# Patient Record
Sex: MALE | Race: WHITE | NOT HISPANIC OR LATINO
[De-identification: names, ages, dates, MRNs, and addresses within clinical notes are randomized per-mention and may not be internally consistent; named-entity substitution may affect disease eponyms.]

---

## 2019-10-10 ENCOUNTER — APPOINTMENT (OUTPATIENT)
Dept: CARDIOLOGY | Facility: CLINIC | Age: 54
End: 2019-10-10
Payer: COMMERCIAL

## 2019-10-10 ENCOUNTER — LABORATORY RESULT (OUTPATIENT)
Age: 54
End: 2019-10-10

## 2019-10-10 ENCOUNTER — TRANSCRIPTION ENCOUNTER (OUTPATIENT)
Age: 54
End: 2019-10-10

## 2019-10-10 ENCOUNTER — NON-APPOINTMENT (OUTPATIENT)
Age: 54
End: 2019-10-10

## 2019-10-10 VITALS
DIASTOLIC BLOOD PRESSURE: 86 MMHG | TEMPERATURE: 98 F | BODY MASS INDEX: 44.1 KG/M2 | HEART RATE: 65 BPM | HEIGHT: 71 IN | SYSTOLIC BLOOD PRESSURE: 150 MMHG | WEIGHT: 315 LBS | OXYGEN SATURATION: 95 %

## 2019-10-10 DIAGNOSIS — Z86.79 PERSONAL HISTORY OF OTHER DISEASES OF THE CIRCULATORY SYSTEM: ICD-10-CM

## 2019-10-10 PROCEDURE — 93000 ELECTROCARDIOGRAM COMPLETE: CPT

## 2019-10-10 PROCEDURE — 99386 PREV VISIT NEW AGE 40-64: CPT

## 2019-10-10 RX ORDER — ADALIMUMAB 40MG/0.8ML
40 KIT SUBCUTANEOUS
Refills: 0 | Status: ACTIVE | COMMUNITY
Start: 2019-10-10

## 2019-10-10 NOTE — HISTORY OF PRESENT ILLNESS
[FreeTextEntry1] : physical  [de-identified] : pt presents for yearly physical .pt with psoriasis /arthritis .pt with sleep apnea .pt with hx of svt s/p ablation 3 years ago .pt here for routine physical .pt feels well rsre dyspnea pt denies any chest  pain dizziness ,lightheadedness ,nausea vomiting diaphoresis\par

## 2019-10-10 NOTE — PHYSICAL EXAM
[No Acute Distress] : no acute distress [Well Nourished] : well nourished [Well Developed] : well developed [Well-Appearing] : well-appearing [Normal Sclera/Conjunctiva] : normal sclera/conjunctiva [PERRL] : pupils equal round and reactive to light [EOMI] : extraocular movements intact [Normal Outer Ear/Nose] : the outer ears and nose were normal in appearance [Normal Oropharynx] : the oropharynx was normal [No JVD] : no jugular venous distention [No Lymphadenopathy] : no lymphadenopathy [Supple] : supple [Thyroid Normal, No Nodules] : the thyroid was normal and there were no nodules present [No Respiratory Distress] : no respiratory distress  [No Accessory Muscle Use] : no accessory muscle use [Normal Rate] : normal rate  [Clear to Auscultation] : lungs were clear to auscultation bilaterally [Regular Rhythm] : with a regular rhythm [Normal S1, S2] : normal S1 and S2 [No Murmur] : no murmur heard [No Carotid Bruits] : no carotid bruits [No Abdominal Bruit] : a ~M bruit was not heard ~T in the abdomen [No Varicosities] : no varicosities [Pedal Pulses Present] : the pedal pulses are present [No Edema] : there was no peripheral edema [No Palpable Aorta] : no palpable aorta [No Extremity Clubbing/Cyanosis] : no extremity clubbing/cyanosis [Soft] : abdomen soft [Non Tender] : non-tender [Non-distended] : non-distended [No Masses] : no abdominal mass palpated [No HSM] : no HSM [Normal Bowel Sounds] : normal bowel sounds [Normal Posterior Cervical Nodes] : no posterior cervical lymphadenopathy [Normal Anterior Cervical Nodes] : no anterior cervical lymphadenopathy [No CVA Tenderness] : no CVA  tenderness [No Spinal Tenderness] : no spinal tenderness [No Joint Swelling] : no joint swelling [Grossly Normal Strength/Tone] : grossly normal strength/tone [No Rash] : no rash [Coordination Grossly Intact] : coordination grossly intact [No Focal Deficits] : no focal deficits [Normal Gait] : normal gait [Deep Tendon Reflexes (DTR)] : deep tendon reflexes were 2+ and symmetric [Normal Affect] : the affect was normal [Normal Insight/Judgement] : insight and judgment were intact [FreeTextEntry1] : normal prostate  [de-identified] : normal genital exam  [de-identified] : psoriatic rash torso lower legs and elbows freckles noted

## 2019-10-12 LAB
25(OH)D3 SERPL-MCNC: 21.6 NG/ML
ALBUMIN SERPL ELPH-MCNC: 4.8 G/DL
ALP BLD-CCNC: 70 U/L
ALT SERPL-CCNC: 44 U/L
ANION GAP SERPL CALC-SCNC: 18 MMOL/L
APPEARANCE: CLEAR
AST SERPL-CCNC: 45 U/L
BACTERIA: NEGATIVE
BASOPHILS # BLD AUTO: 0.1 K/UL
BASOPHILS NFR BLD AUTO: 1.2 %
BILIRUB SERPL-MCNC: 0.4 MG/DL
BILIRUBIN URINE: NEGATIVE
BLOOD URINE: NEGATIVE
BUN SERPL-MCNC: 12 MG/DL
CALCIUM SERPL-MCNC: 10.1 MG/DL
CHLORIDE SERPL-SCNC: 97 MMOL/L
CHOLEST SERPL-MCNC: 238 MG/DL
CHOLEST/HDLC SERPL: 7.2 RATIO
CO2 SERPL-SCNC: 25 MMOL/L
COLOR: YELLOW
CREAT SERPL-MCNC: 0.68 MG/DL
EOSINOPHIL # BLD AUTO: 0.26 K/UL
EOSINOPHIL NFR BLD AUTO: 3.1 %
ESTIMATED AVERAGE GLUCOSE: 209 MG/DL
GLUCOSE QUALITATIVE U: ABNORMAL
GLUCOSE SERPL-MCNC: 208 MG/DL
HBA1C MFR BLD HPLC: 8.9 %
HCT VFR BLD CALC: 48.8 %
HDLC SERPL-MCNC: 33 MG/DL
HGB BLD-MCNC: 16 G/DL
HYALINE CASTS: 0 /LPF
IMM GRANULOCYTES NFR BLD AUTO: 0.2 %
KETONES URINE: NEGATIVE
LDLC SERPL CALC-MCNC: 140 MG/DL
LEUKOCYTE ESTERASE URINE: NEGATIVE
LYMPHOCYTES # BLD AUTO: 3.42 K/UL
LYMPHOCYTES NFR BLD AUTO: 40.2 %
MAGNESIUM SERPL-MCNC: 1.9 MG/DL
MAN DIFF?: NORMAL
MCHC RBC-ENTMCNC: 30 PG
MCHC RBC-ENTMCNC: 32.8 GM/DL
MCV RBC AUTO: 91.4 FL
MICROSCOPIC-UA: NORMAL
MONOCYTES # BLD AUTO: 0.67 K/UL
MONOCYTES NFR BLD AUTO: 7.9 %
NEUTROPHILS # BLD AUTO: 4.03 K/UL
NEUTROPHILS NFR BLD AUTO: 47.4 %
NITRITE URINE: NEGATIVE
PH URINE: 6
PHOSPHATE SERPL-MCNC: 2.9 MG/DL
PLATELET # BLD AUTO: 246 K/UL
POTASSIUM SERPL-SCNC: 4.8 MMOL/L
PROT SERPL-MCNC: 7.9 G/DL
PROTEIN URINE: NORMAL
PSA SERPL-MCNC: 0.44 NG/ML
RBC # BLD: 5.34 M/UL
RBC # FLD: 13.2 %
RED BLOOD CELLS URINE: 1 /HPF
SODIUM SERPL-SCNC: 140 MMOL/L
SPECIFIC GRAVITY URINE: 1.03
SQUAMOUS EPITHELIAL CELLS: 0 /HPF
T3RU NFR SERPL: 1.1 TBI
T4 FREE SERPL-MCNC: 1.1 NG/DL
T4 SERPL-MCNC: 7 UG/DL
TRIGL SERPL-MCNC: 323 MG/DL
TSH SERPL-ACNC: 2.95 UIU/ML
URATE SERPL-MCNC: 8.2 MG/DL
UROBILINOGEN URINE: NORMAL
WBC # FLD AUTO: 8.5 K/UL
WHITE BLOOD CELLS URINE: 1 /HPF

## 2019-10-30 ENCOUNTER — APPOINTMENT (OUTPATIENT)
Dept: CARDIOLOGY | Facility: CLINIC | Age: 54
End: 2019-10-30
Payer: COMMERCIAL

## 2019-10-30 PROCEDURE — 93321 DOPPLER ECHO F-UP/LMTD STD: CPT

## 2019-10-30 PROCEDURE — 93351 STRESS TTE COMPLETE: CPT

## 2019-10-30 PROCEDURE — 93325 DOPPLER ECHO COLOR FLOW MAPG: CPT

## 2019-11-08 ENCOUNTER — NON-APPOINTMENT (OUTPATIENT)
Age: 54
End: 2019-11-08

## 2019-11-09 LAB
APPEARANCE: CLEAR
BACTERIA: NEGATIVE
BASOPHILS # BLD AUTO: 0.1 K/UL
BASOPHILS NFR BLD AUTO: 1.1 %
BILIRUBIN URINE: NEGATIVE
BLOOD URINE: NEGATIVE
CHOLEST SERPL-MCNC: 132 MG/DL
CHOLEST/HDLC SERPL: 3.9 RATIO
COLOR: NORMAL
CREAT SPEC-SCNC: 85 MG/DL
EOSINOPHIL # BLD AUTO: 0.32 K/UL
EOSINOPHIL NFR BLD AUTO: 3.4 %
GLUCOSE QUALITATIVE U: NEGATIVE
HCT VFR BLD CALC: 45.5 %
HDLC SERPL-MCNC: 34 MG/DL
HGB BLD-MCNC: 14.6 G/DL
HYALINE CASTS: 0 /LPF
IMM GRANULOCYTES NFR BLD AUTO: 0.3 %
KETONES URINE: NEGATIVE
LDLC SERPL CALC-MCNC: 68 MG/DL
LEUKOCYTE ESTERASE URINE: NEGATIVE
LYMPHOCYTES # BLD AUTO: 3.33 K/UL
LYMPHOCYTES NFR BLD AUTO: 35.7 %
MAN DIFF?: NORMAL
MCHC RBC-ENTMCNC: 29.4 PG
MCHC RBC-ENTMCNC: 32.1 GM/DL
MCV RBC AUTO: 91.7 FL
MICROALBUMIN 24H UR DL<=1MG/L-MCNC: <1.2 MG/DL
MICROALBUMIN/CREAT 24H UR-RTO: NORMAL MG/G
MICROSCOPIC-UA: NORMAL
MONOCYTES # BLD AUTO: 0.73 K/UL
MONOCYTES NFR BLD AUTO: 7.8 %
NEUTROPHILS # BLD AUTO: 4.81 K/UL
NEUTROPHILS NFR BLD AUTO: 51.7 %
NITRITE URINE: NEGATIVE
PH URINE: 6.5
PLATELET # BLD AUTO: 240 K/UL
PROTEIN URINE: NEGATIVE
RBC # BLD: 4.96 M/UL
RBC # FLD: 13.3 %
RED BLOOD CELLS URINE: 1 /HPF
SPECIFIC GRAVITY URINE: 1.01
SQUAMOUS EPITHELIAL CELLS: 0 /HPF
TRIGL SERPL-MCNC: 148 MG/DL
UROBILINOGEN URINE: NORMAL
WBC # FLD AUTO: 9.32 K/UL
WHITE BLOOD CELLS URINE: 0 /HPF

## 2019-11-16 PROCEDURE — 93224 XTRNL ECG REC UP TO 48 HRS: CPT

## 2019-11-18 ENCOUNTER — FORM ENCOUNTER (OUTPATIENT)
Age: 54
End: 2019-11-18

## 2019-11-19 ENCOUNTER — APPOINTMENT (OUTPATIENT)
Dept: PULMONOLOGY | Facility: CLINIC | Age: 54
End: 2019-11-19
Payer: COMMERCIAL

## 2019-11-19 ENCOUNTER — APPOINTMENT (OUTPATIENT)
Dept: GASTROENTEROLOGY | Facility: CLINIC | Age: 54
End: 2019-11-19
Payer: COMMERCIAL

## 2019-11-19 ENCOUNTER — TRANSCRIPTION ENCOUNTER (OUTPATIENT)
Age: 54
End: 2019-11-19

## 2019-11-19 VITALS
WEIGHT: 310 LBS | HEART RATE: 62 BPM | SYSTOLIC BLOOD PRESSURE: 132 MMHG | DIASTOLIC BLOOD PRESSURE: 82 MMHG | BODY MASS INDEX: 43.4 KG/M2 | OXYGEN SATURATION: 94 % | HEIGHT: 71 IN | RESPIRATION RATE: 16 BRPM

## 2019-11-19 VITALS
TEMPERATURE: 98.2 F | SYSTOLIC BLOOD PRESSURE: 130 MMHG | OXYGEN SATURATION: 98 % | BODY MASS INDEX: 44.1 KG/M2 | DIASTOLIC BLOOD PRESSURE: 70 MMHG | HEART RATE: 65 BPM | HEIGHT: 71 IN | WEIGHT: 315 LBS

## 2019-11-19 DIAGNOSIS — Z82.49 FAMILY HISTORY OF ISCHEMIC HEART DISEASE AND OTHER DISEASES OF THE CIRCULATORY SYSTEM: ICD-10-CM

## 2019-11-19 DIAGNOSIS — Z86.39 PERSONAL HISTORY OF OTHER ENDOCRINE, NUTRITIONAL AND METABOLIC DISEASE: ICD-10-CM

## 2019-11-19 DIAGNOSIS — Z78.9 OTHER SPECIFIED HEALTH STATUS: ICD-10-CM

## 2019-11-19 DIAGNOSIS — Z98.890 OTHER SPECIFIED POSTPROCEDURAL STATES: ICD-10-CM

## 2019-11-19 DIAGNOSIS — Z83.3 FAMILY HISTORY OF DIABETES MELLITUS: ICD-10-CM

## 2019-11-19 DIAGNOSIS — Z86.79 OTHER SPECIFIED POSTPROCEDURAL STATES: ICD-10-CM

## 2019-11-19 PROCEDURE — 94729 DIFFUSING CAPACITY: CPT

## 2019-11-19 PROCEDURE — 94060 EVALUATION OF WHEEZING: CPT

## 2019-11-19 PROCEDURE — 99203 OFFICE O/P NEW LOW 30 MIN: CPT | Mod: 25

## 2019-11-19 PROCEDURE — 94727 GAS DIL/WSHOT DETER LNG VOL: CPT

## 2019-11-19 PROCEDURE — 99205 OFFICE O/P NEW HI 60 MIN: CPT | Mod: 25

## 2019-11-19 PROCEDURE — 99204 OFFICE O/P NEW MOD 45 MIN: CPT

## 2019-11-19 NOTE — ASSESSMENT
[FreeTextEntry1] : Obtain CPAP data. Pt to call with company\par Obtain Poly\par Weight loss recommended and discussed.\par Recomendations after review of above.

## 2019-11-19 NOTE — DISCUSSION/SUMMARY
[FreeTextEntry1] : CONCEPCIÓN on CPAP\par Overweight\par Mild restrictive physiology related to body habitus. \par Elevated [place] on CPAP

## 2019-11-19 NOTE — CONSULT LETTER
[Dear  ___] : Dear  [unfilled], [Consult Letter:] : I had the pleasure of evaluating your patient, [unfilled]. [Consult Closing:] : Thank you very much for allowing me to participate in the care of this patient.  If you have any questions, please do not hesitate to contact me. [Please see my note below.] : Please see my note below. [Sincerely,] : Sincerely, [FreeTextEntry3] : Conor Oh MD, FACG\par

## 2019-11-19 NOTE — HISTORY OF PRESENT ILLNESS
[Heartburn] : denies heartburn [Nausea] : denies nausea [Vomiting] : denies vomiting [Diarrhea] : denies diarrhea [Yellow Skin Or Eyes (Jaundice)] : denies jaundice [Constipation] : denies constipation [Abdominal Swelling] : denies abdominal swelling [Abdominal Pain] : denies abdominal pain [Rectal Pain] : denies rectal pain [Wt Gain ___ Lbs] : no recent weight gain [Wt Loss ___ Lbs] : no recent weight loss [GERD] : no gastroesophageal reflux disease [Hiatus Hernia] : no hiatus hernia [Peptic Ulcer Disease] : no peptic ulcer disease [Pancreatitis] : no pancreatitis [Cholelithiasis] : no cholelithiasis [Kidney Stone] : no kidney stone [Inflammatory Bowel Disease] : no inflammatory bowel disease [Irritable Bowel Syndrome] : no irritable bowel syndrome [Diverticulitis] : no diverticulitis [Alcohol Abuse] : no alcohol abuse [Malignancy] : no malignancy [Abdominal Surgery] : no abdominal surgery [Appendectomy] : no appendectomy [Cholecystectomy] : no cholecystectomy [de-identified] : 54 year old man referred forr a screening colonoscopy. This will be his first colonoscopy. He denies rectal bleeding, melena or hematemesis. He has Psoriatic Arthritis for which he takes Humira. He is /s/p ablation fo SVT. He has CONCEPCIÓN for which he wears a mask. [FreeTextEntry1] : First colonoscopy

## 2019-11-19 NOTE — PHYSICAL EXAM
[Sclera] : the sclera and conjunctiva were normal [General Appearance - In No Acute Distress] : in no acute distress [General Appearance - Alert] : alert [Extraocular Movements] : extraocular movements were intact [PERRL With Normal Accommodation] : pupils were equal in size, round, and reactive to light [Outer Ear] : the ears and nose were normal in appearance [Oropharynx] : the oropharynx was normal [Jugular Venous Distention Increased] : there was no jugular-venous distention [Neck Appearance] : the appearance of the neck was normal [Neck Cervical Mass (___cm)] : no neck mass was observed [Thyroid Diffuse Enlargement] : the thyroid was not enlarged [Thyroid Nodule] : there were no palpable thyroid nodules [Auscultation Breath Sounds / Voice Sounds] : lungs were clear to auscultation bilaterally [Heart Rate And Rhythm] : heart rate was normal and rhythm regular [Heart Sounds] : normal S1 and S2 [Heart Sounds Gallop] : no gallops [Heart Sounds Pericardial Friction Rub] : no pericardial rub [Murmurs] : no murmurs [Bowel Sounds] : normal bowel sounds [Abdomen Soft] : soft [Abdomen Tenderness] : non-tender [] : no hepato-splenomegaly [Abdomen Mass (___ Cm)] : no abdominal mass palpated [Cervical Lymph Nodes Enlarged Posterior Bilaterally] : posterior cervical [Cervical Lymph Nodes Enlarged Anterior Bilaterally] : anterior cervical [Supraclavicular Lymph Nodes Enlarged Bilaterally] : supraclavicular [No CVA Tenderness] : no ~M costovertebral angle tenderness [No Spinal Tenderness] : no spinal tenderness [Abnormal Walk] : normal gait [Motor Tone] : muscle strength and tone were normal [Musculoskeletal - Swelling] : no joint swelling seen [Nail Clubbing] : no clubbing  or cyanosis of the fingernails [Deep Tendon Reflexes (DTR)] : deep tendon reflexes were 2+ and symmetric [Sensation] : the sensory exam was normal to light touch and pinprick [No Focal Deficits] : no focal deficits [Oriented To Time, Place, And Person] : oriented to person, place, and time [Impaired Insight] : insight and judgment were intact [Affect] : the affect was normal

## 2019-11-19 NOTE — HISTORY OF PRESENT ILLNESS
[Obstructive Sleep Apnea] : obstructive sleep apnea [FreeTextEntry1] : SELENE JUSTICE is a 54 year old  M referred for pulmonary evaluation for CONCEPCIÓN\par \par On CPAP. Starts with CPAP wears 3-4 hours then takes it off. Wakes up and it is off.\par Prescirbed Dr. Gonzalez in [place] 1 year ago. Was primary care doctor.\par \par On [employer]. Unaware of parameters. \par Company \par \par Past pulmonary history.  CONCEPCIÓN\par Occupational Exposure. N\par Family history of pulmonary disease. N\par Recent travel 2-3 x yre [place]\par Pets Dog not allert\par \par Wt  trying to lose. \par \par Feeling pretty well\par Snoring with machine off. \par

## 2019-11-19 NOTE — PROCEDURE
[FreeTextEntry1] : 11/19/2019\par Pulmonary function testing\par There is a mild ventilatory impairment in a restrictive pattern. There is no significant bronchodilator response. There is elevation in the RV/TLC ratio indicative of possible air trapping. Diffusion capacity is normal. \par \par [place]

## 2019-11-19 NOTE — PHYSICAL EXAM
[FreeTextEntry1] : Small oropharynx [Jugular Venous Distention Increased] : there was no jugular-venous distention [Thyroid Diffuse Enlargement] : the thyroid was not enlarged [Heart Sounds] : normal S1 and S2 [Murmurs] : no murmurs present [Auscultation Breath Sounds / Voice Sounds] : lungs were clear to auscultation bilaterally [Lungs Percussion] : the lungs were normal to percussion [Abdomen Soft] : soft [] : no hepato-splenomegaly [Abdomen Tenderness] : non-tender [Cyanosis, Localized] : no localized cyanosis [Nail Clubbing] : no clubbing of the fingernails

## 2019-11-20 ENCOUNTER — TRANSCRIPTION ENCOUNTER (OUTPATIENT)
Age: 54
End: 2019-11-20

## 2019-11-20 ENCOUNTER — APPOINTMENT (OUTPATIENT)
Dept: ENDOCRINOLOGY | Facility: CLINIC | Age: 54
End: 2019-11-20
Payer: COMMERCIAL

## 2019-11-20 ENCOUNTER — MEDICATION RENEWAL (OUTPATIENT)
Age: 54
End: 2019-11-20

## 2019-11-20 VITALS
HEART RATE: 58 BPM | DIASTOLIC BLOOD PRESSURE: 72 MMHG | BODY MASS INDEX: 44.1 KG/M2 | WEIGHT: 315 LBS | OXYGEN SATURATION: 96 % | HEIGHT: 71 IN | SYSTOLIC BLOOD PRESSURE: 124 MMHG

## 2019-11-20 PROCEDURE — 82044 UR ALBUMIN SEMIQUANTITATIVE: CPT | Mod: QW

## 2019-11-20 PROCEDURE — 83036 HEMOGLOBIN GLYCOSYLATED A1C: CPT | Mod: QW

## 2019-11-20 PROCEDURE — 99204 OFFICE O/P NEW MOD 45 MIN: CPT

## 2019-11-20 PROCEDURE — 82962 GLUCOSE BLOOD TEST: CPT

## 2019-11-20 RX ORDER — LANCETS 33 GAUGE
EACH MISCELLANEOUS
Qty: 2 | Refills: 3 | Status: ACTIVE | COMMUNITY
Start: 2019-11-20 | End: 1900-01-01

## 2019-11-20 RX ORDER — BLOOD SUGAR DIAGNOSTIC
STRIP MISCELLANEOUS DAILY
Qty: 2 | Refills: 3 | Status: ACTIVE | COMMUNITY
Start: 2019-11-20 | End: 1900-01-01

## 2019-11-21 LAB
ALBUMIN: 10
CREATININE: 100
GLUCOSE BLDC GLUCOMTR-MCNC: 132
HBA1C MFR BLD HPLC: 7.9
MICROALBUMIN/CREAT UR TEST STR-RTO: 30

## 2019-12-08 NOTE — HISTORY OF PRESENT ILLNESS
[FreeTextEntry1] : Mr. JUSTICE   is a 54 year old male  who presents for initial evaluation with regard to elevated blood glucose recent  HHbA1c level returned at 8.9% He presents via the courtesy of Dr. Richards.  I explained that this would be consistent with a dx of diabetes mellitus. In previous years -told of borderline bg elevation.Patient denies any polyuria or polydipsia. Too He  denies any significant weight change or any visual changes.  He  too denies any neurologic signs or symptoms. There too has been no chest discomfort or sob.  Further, He    denies any podiatric concerns and too denies any non-healing skin lesions on the feet.\par Additional medical history includes that of  hyperlipidemia, obesity , francia and vitamin d deficiency along with psoriatic arthritis and svt. He is on Humira along with Atorvastatin and too was recently placed on Metformin 500 mg bid.\par he did obtain bg meter and am values about 160  and aat 8 pm-about 2 /12 hrs post dinner bg's are in [id number]\par Wt a t 308 -stable over last 3-4 yrs\par mat FG hd type 2 was on insulin.   2 sisters no Dm\par Does have sleep apnea -uses cpap\par DId have ablation for svt.\par Now walking with wife-too, to obtain [place] treadmill.\par had met with dietitian.

## 2019-12-08 NOTE — PHYSICAL EXAM
[No Acute Distress] : no acute distress [Alert] : alert [Well Nourished] : well nourished [Well Developed] : well developed [Normal Sclera/Conjunctiva] : normal sclera/conjunctiva [No Proptosis] : no proptosis [EOMI] : extra ocular movement intact [Thyroid Not Enlarged] : the thyroid was not enlarged [Normal Oropharynx] : the oropharynx was normal [No Thyroid Nodules] : there were no palpable thyroid nodules [No Respiratory Distress] : no respiratory distress [Clear to Auscultation] : lungs were clear to auscultation bilaterally [No Accessory Muscle Use] : no accessory muscle use [Normal S1, S2] : normal S1 and S2 [Normal Rate] : heart rate was normal  [Regular Rhythm] : with a regular rhythm [Pedal Pulses Normal] : the pedal pulses are present [No Edema] : there was no peripheral edema [Normal Bowel Sounds] : normal bowel sounds [Not Tender] : non-tender [Soft] : abdomen soft [Post Cervical Nodes] : posterior cervical nodes [Not Distended] : not distended [Anterior Cervical Nodes] : anterior cervical nodes [Axillary Nodes] : axillary nodes [Normal] : normal and non tender [No Spinal Tenderness] : no spinal tenderness [No Stigmata of Cushings Syndrome] : no stigmata of cushings syndrome [Spine Straight] : spine straight [Normal Strength/Tone] : muscle strength and tone were normal [Normal Gait] : normal gait [No Rash] : no rash [No Tremors] : no tremors [Normal Reflexes] : deep tendon reflexes were 2+ and symmetric [Oriented x3] : oriented to person, place, and time [Acanthosis Nigricans] : no acanthosis nigricans

## 2019-12-17 ENCOUNTER — OTHER (OUTPATIENT)
Age: 54
End: 2019-12-17

## 2020-01-03 ENCOUNTER — APPOINTMENT (OUTPATIENT)
Dept: CARDIOLOGY | Facility: CLINIC | Age: 55
End: 2020-01-03

## 2020-01-24 ENCOUNTER — APPOINTMENT (OUTPATIENT)
Dept: GASTROENTEROLOGY | Facility: HOSPITAL | Age: 55
End: 2020-01-24

## 2020-03-04 ENCOUNTER — APPOINTMENT (OUTPATIENT)
Dept: CARDIOLOGY | Facility: CLINIC | Age: 55
End: 2020-03-04
Payer: COMMERCIAL

## 2020-03-04 ENCOUNTER — NON-APPOINTMENT (OUTPATIENT)
Age: 55
End: 2020-03-04

## 2020-03-04 VITALS — SYSTOLIC BLOOD PRESSURE: 150 MMHG | DIASTOLIC BLOOD PRESSURE: 82 MMHG

## 2020-03-04 VITALS
HEIGHT: 71 IN | DIASTOLIC BLOOD PRESSURE: 86 MMHG | HEART RATE: 75 BPM | TEMPERATURE: 98 F | SYSTOLIC BLOOD PRESSURE: 158 MMHG | OXYGEN SATURATION: 95 % | WEIGHT: 301 LBS | BODY MASS INDEX: 42.14 KG/M2

## 2020-03-04 DIAGNOSIS — R06.02 SHORTNESS OF BREATH: ICD-10-CM

## 2020-03-04 DIAGNOSIS — M79.10 MYALGIA, UNSPECIFIED SITE: ICD-10-CM

## 2020-03-04 DIAGNOSIS — R94.39 ABNORMAL RESULT OF OTHER CARDIOVASCULAR FUNCTION STUDY: ICD-10-CM

## 2020-03-04 PROCEDURE — 99214 OFFICE O/P EST MOD 30 MIN: CPT

## 2020-03-04 PROCEDURE — 78452 HT MUSCLE IMAGE SPECT MULT: CPT

## 2020-03-04 PROCEDURE — 93306 TTE W/DOPPLER COMPLETE: CPT

## 2020-03-04 PROCEDURE — A9500: CPT

## 2020-03-04 PROCEDURE — 93015 CV STRESS TEST SUPVJ I&R: CPT

## 2020-03-04 PROCEDURE — 93351 STRESS TTE COMPLETE: CPT

## 2020-03-04 PROCEDURE — 93000 ELECTROCARDIOGRAM COMPLETE: CPT

## 2020-03-04 NOTE — REASON FOR VISIT
[Follow-Up - Clinic] : a clinic follow-up of [Dyspnea] : dyspnea [Hyperlipidemia] : hyperlipidemia [Hypertension] : hypertension [FreeTextEntry1] : Acute issue of SOB and Muscle Fatigue

## 2020-03-04 NOTE — HISTORY OF PRESENT ILLNESS
[FreeTextEntry1] : This is a 54 year old gentlemen with a PMH of SVT, HTN, DM, psoriasis/Arthritis, CONCEPCIÓN, and Vitamin D deficiency presents today for an acute cardiac issue. Patient states that for the last 3-4 days he has been experiencing  shortness of breath with exertion, he has noticed extreme muscle aching. Patient states that this AM he was seen by the Rheumatologist- Dr. Buck and was told that his BP is elevated. Patient denies , palpitations, chest pain, nausea, vomiting, dizziness and lightheadedness.pt with c.p with exertion \par

## 2020-03-04 NOTE — PHYSICAL EXAM
[General Appearance - Well Developed] : well developed [Normal Appearance] : normal appearance [General Appearance - Well Nourished] : well nourished [Well Groomed] : well groomed [No Deformities] : no deformities [General Appearance - In No Acute Distress] : no acute distress [Normal Conjunctiva] : the conjunctiva exhibited no abnormalities [Eyelids - No Xanthelasma] : the eyelids demonstrated no xanthelasmas [Normal Oral Mucosa] : normal oral mucosa [No Oral Pallor] : no oral pallor [No Oral Cyanosis] : no oral cyanosis [Normal Jugular Venous A Waves Present] : normal jugular venous A waves present [Normal Jugular Venous V Waves Present] : normal jugular venous V waves present [No Jugular Venous Reis A Waves] : no jugular venous reis A waves [Respiration, Rhythm And Depth] : normal respiratory rhythm and effort [Exaggerated Use Of Accessory Muscles For Inspiration] : no accessory muscle use [Auscultation Breath Sounds / Voice Sounds] : lungs were clear to auscultation bilaterally [Heart Rate And Rhythm] : heart rate and rhythm were normal [Heart Sounds] : normal S1 and S2 [Murmurs] : no murmurs present [Abdomen Soft] : soft [Abdomen Tenderness] : non-tender [Abdomen Mass (___ Cm)] : no abdominal mass palpated [Abnormal Walk] : normal gait [Gait - Sufficient For Exercise Testing] : the gait was sufficient for exercise testing [Cyanosis, Localized] : no localized cyanosis [Nail Clubbing] : no clubbing of the fingernails [Petechial Hemorrhages (___cm)] : no petechial hemorrhages [Skin Color & Pigmentation] : normal skin color and pigmentation [] : no rash [No Venous Stasis] : no venous stasis [Skin Lesions] : no skin lesions [No Xanthoma] : no  xanthoma was observed [No Skin Ulcers] : no skin ulcer [Oriented To Time, Place, And Person] : oriented to person, place, and time [Affect] : the affect was normal [Mood] : the mood was normal [No Anxiety] : not feeling anxious

## 2020-03-04 NOTE — REVIEW OF SYSTEMS
[Shortness Of Breath] : shortness of breath [Dyspnea on exertion] : dyspnea during exertion [Joint Pain] : joint pain [Negative] : Heme/Lymph [Chest  Pressure] : no chest pressure [Chest Pain] : no chest pain [Lower Ext Edema] : no extremity edema [Palpitations] : no palpitations [Leg Claudication] : no intermittent leg claudication [Joint Swelling] : no joint swelling [Joint Stiffness] : no joint stiffness [Muscle Cramps] : no muscle cramps [Limb Weakness (Paresis)] : no limb weakness

## 2020-03-05 ENCOUNTER — INPATIENT (INPATIENT)
Facility: HOSPITAL | Age: 55
LOS: 0 days | Discharge: HOME | DRG: 247 | End: 2020-03-06
Attending: INTERNAL MEDICINE | Admitting: INTERNAL MEDICINE
Payer: COMMERCIAL

## 2020-03-05 VITALS
SYSTOLIC BLOOD PRESSURE: 134 MMHG | HEART RATE: 65 BPM | TEMPERATURE: 98 F | OXYGEN SATURATION: 95 % | RESPIRATION RATE: 14 BRPM | HEIGHT: 71 IN | DIASTOLIC BLOOD PRESSURE: 63 MMHG | WEIGHT: 300.05 LBS

## 2020-03-05 DIAGNOSIS — R94.39 ABNORMAL RESULT OF OTHER CARDIOVASCULAR FUNCTION STUDY: ICD-10-CM

## 2020-03-05 DIAGNOSIS — Q68.6 DISCOID MENISCUS: Chronic | ICD-10-CM

## 2020-03-05 DIAGNOSIS — Z98.890 OTHER SPECIFIED POSTPROCEDURAL STATES: Chronic | ICD-10-CM

## 2020-03-05 LAB
ANION GAP SERPL CALC-SCNC: 15 MMOL/L — SIGNIFICANT CHANGE UP (ref 5–17)
BUN SERPL-MCNC: 13 MG/DL — SIGNIFICANT CHANGE UP (ref 7–23)
CALCIUM SERPL-MCNC: 9.4 MG/DL — SIGNIFICANT CHANGE UP (ref 8.4–10.5)
CHLORIDE SERPL-SCNC: 100 MMOL/L — SIGNIFICANT CHANGE UP (ref 96–108)
CO2 SERPL-SCNC: 27 MMOL/L — SIGNIFICANT CHANGE UP (ref 22–31)
CREAT SERPL-MCNC: 0.78 MG/DL — SIGNIFICANT CHANGE UP (ref 0.5–1.3)
GLUCOSE BLDC GLUCOMTR-MCNC: 169 MG/DL — HIGH (ref 70–99)
GLUCOSE SERPL-MCNC: 137 MG/DL — HIGH (ref 70–99)
HCT VFR BLD CALC: 46.5 % — SIGNIFICANT CHANGE UP (ref 39–50)
HGB BLD-MCNC: 15.3 G/DL — SIGNIFICANT CHANGE UP (ref 13–17)
MCHC RBC-ENTMCNC: 28.9 PG — SIGNIFICANT CHANGE UP (ref 27–34)
MCHC RBC-ENTMCNC: 32.9 GM/DL — SIGNIFICANT CHANGE UP (ref 32–36)
MCV RBC AUTO: 87.9 FL — SIGNIFICANT CHANGE UP (ref 80–100)
NRBC # BLD: 0 /100 WBCS — SIGNIFICANT CHANGE UP (ref 0–0)
PLATELET # BLD AUTO: 230 K/UL — SIGNIFICANT CHANGE UP (ref 150–400)
POTASSIUM SERPL-MCNC: 4.1 MMOL/L — SIGNIFICANT CHANGE UP (ref 3.5–5.3)
POTASSIUM SERPL-SCNC: 4.1 MMOL/L — SIGNIFICANT CHANGE UP (ref 3.5–5.3)
RBC # BLD: 5.29 M/UL — SIGNIFICANT CHANGE UP (ref 4.2–5.8)
RBC # FLD: 13 % — SIGNIFICANT CHANGE UP (ref 10.3–14.5)
SODIUM SERPL-SCNC: 142 MMOL/L — SIGNIFICANT CHANGE UP (ref 135–145)
WBC # BLD: 9.47 K/UL — SIGNIFICANT CHANGE UP (ref 3.8–10.5)
WBC # FLD AUTO: 9.47 K/UL — SIGNIFICANT CHANGE UP (ref 3.8–10.5)

## 2020-03-05 PROCEDURE — 93458 L HRT ARTERY/VENTRICLE ANGIO: CPT | Mod: 26,59

## 2020-03-05 PROCEDURE — 99152 MOD SED SAME PHYS/QHP 5/>YRS: CPT

## 2020-03-05 PROCEDURE — 92928 PRQ TCAT PLMT NTRAC ST 1 LES: CPT | Mod: LD

## 2020-03-05 PROCEDURE — 93010 ELECTROCARDIOGRAM REPORT: CPT | Mod: 76

## 2020-03-05 RX ORDER — CLOPIDOGREL BISULFATE 75 MG/1
75 TABLET, FILM COATED ORAL DAILY
Refills: 0 | Status: DISCONTINUED | OUTPATIENT
Start: 2020-03-05 | End: 2020-03-06

## 2020-03-05 RX ORDER — DEXTROSE 50 % IN WATER 50 %
15 SYRINGE (ML) INTRAVENOUS ONCE
Refills: 0 | Status: DISCONTINUED | OUTPATIENT
Start: 2020-03-05 | End: 2020-03-06

## 2020-03-05 RX ORDER — ATORVASTATIN CALCIUM 80 MG/1
20 TABLET, FILM COATED ORAL AT BEDTIME
Refills: 0 | Status: DISCONTINUED | OUTPATIENT
Start: 2020-03-05 | End: 2020-03-06

## 2020-03-05 RX ORDER — ASPIRIN/CALCIUM CARB/MAGNESIUM 324 MG
81 TABLET ORAL DAILY
Refills: 0 | Status: DISCONTINUED | OUTPATIENT
Start: 2020-03-05 | End: 2020-03-06

## 2020-03-05 RX ORDER — INSULIN LISPRO 100/ML
VIAL (ML) SUBCUTANEOUS AT BEDTIME
Refills: 0 | Status: DISCONTINUED | OUTPATIENT
Start: 2020-03-05 | End: 2020-03-06

## 2020-03-05 RX ORDER — GLUCAGON INJECTION, SOLUTION 0.5 MG/.1ML
1 INJECTION, SOLUTION SUBCUTANEOUS ONCE
Refills: 0 | Status: DISCONTINUED | OUTPATIENT
Start: 2020-03-05 | End: 2020-03-06

## 2020-03-05 RX ORDER — DEXTROSE 50 % IN WATER 50 %
25 SYRINGE (ML) INTRAVENOUS ONCE
Refills: 0 | Status: DISCONTINUED | OUTPATIENT
Start: 2020-03-05 | End: 2020-03-06

## 2020-03-05 RX ORDER — DEXTROSE 50 % IN WATER 50 %
12.5 SYRINGE (ML) INTRAVENOUS ONCE
Refills: 0 | Status: DISCONTINUED | OUTPATIENT
Start: 2020-03-05 | End: 2020-03-06

## 2020-03-05 RX ORDER — CLOPIDOGREL BISULFATE 75 MG/1
1 TABLET, FILM COATED ORAL
Qty: 90 | Refills: 3
Start: 2020-03-05 | End: 2021-02-27

## 2020-03-05 RX ORDER — INSULIN LISPRO 100/ML
VIAL (ML) SUBCUTANEOUS
Refills: 0 | Status: DISCONTINUED | OUTPATIENT
Start: 2020-03-05 | End: 2020-03-06

## 2020-03-05 RX ORDER — SODIUM CHLORIDE 9 MG/ML
1000 INJECTION, SOLUTION INTRAVENOUS
Refills: 0 | Status: DISCONTINUED | OUTPATIENT
Start: 2020-03-05 | End: 2020-03-06

## 2020-03-05 RX ADMIN — ATORVASTATIN CALCIUM 20 MILLIGRAM(S): 80 TABLET, FILM COATED ORAL at 20:56

## 2020-03-05 NOTE — CHART NOTE - NSCHARTNOTEFT_GEN_A_CORE
Patient underwent a PCI procedure and is being admitted as they are at increased risk for major adverse cardiac and vascular events if discharged due to the following high risk characteristics:  Bifurcation lesion

## 2020-03-05 NOTE — H&P CARDIOLOGY - PMH
Arthritis    DM (diabetes mellitus)    HLD (hyperlipidemia)    CONCEPCIÓN (obstructive sleep apnea)  uses CPAP  Psoriasis    SVT (supraventricular tachycardia)  s/p ablation  Vitamin D deficiency

## 2020-03-05 NOTE — H&P CARDIOLOGY - HISTORY OF PRESENT ILLNESS
53 y/o  male with PMHx of DM2, HLD, SVT, s/p Ablation, CONCEPCIÓN uses CPAP, OA, Psoriasis presents today for LHC following an abnormal NST. Patient states he has exertional SOB for the past few weeks, noticed muscle aching Patient was seen by DR. Richards, NST done which was abnormal and referred for LHC. Denies chest pain, palpitation, dizziness/ syncope. 53 y/o  male with PMHx of DM2, HLD, SVT, s/p Ablation, CONCEPCIÓN uses CPAP, OA, Psoriasis presents today for LHC following an abnormal NST ( result not available). Patient states he has exertional SOB for the past few weeks, noticed muscle aching Patient was seen by DR. Richards, NST done which was abnormal and referred for LHC. Denies chest pain, palpitation, dizziness/ syncope.   Patient had stress echo from 11/2019 which shows EF 60-65%, normal response during exercise and there was no stress induced wall motion abnormalities.  Narrative:     Symptoms:        Angina (Class): Class II       Ischemic Symptoms: SOB    Heart Failure:        Systolic/Diastolic/Combined: yes       NYHA Class (within 2 weeks): yes    Assessment of LVEF: 65%       EF: 65%       Assessed by: Stress echo       Date: 11/2019    Prior Cardiac Interventions:       PCI's:No       CABG: No    Noninvasive Testing: NST ( result not available)  Stress Test: Date:        Protocol:        Duration of Exercise:        Symptoms:        EKG Changes:        DTS:        Myocardial Imaging:        Risk Assessment:     Echo: Stress echo in 2019, EF 65%    Antianginal Therapies: No       Beta Blockers:         Calcium Channel Blockers:        Long Acting Nitrates:        Ranexa:     Associated Risk Factors:        Cerebrovascular Disease: N/A       Chronic Lung Disease: N/A       Peripheral Arterial Disease: N/A       Chronic Kidney Disease (if yes, what is GFR): N/A       Uncontrolled Diabetes (if yes, what is HgbA1C or FBS): N/A       Poorly Controlled Hypertension (if yes, what is SBP): N/A       Morbid Obesity (if yes, what is BMI): yes, BMI 42       History of Recent Ventricular Arrhythmia: N/A       Inability to Ambulate Safely: N/A       Need for Therapeutic Anticoagulation: N/A       Antiplatelet or Contrast Allergy: N/A

## 2020-03-06 ENCOUNTER — TRANSCRIPTION ENCOUNTER (OUTPATIENT)
Age: 55
End: 2020-03-06

## 2020-03-06 VITALS — DIASTOLIC BLOOD PRESSURE: 68 MMHG | HEART RATE: 66 BPM | SYSTOLIC BLOOD PRESSURE: 146 MMHG

## 2020-03-06 LAB
GLUCOSE BLDC GLUCOMTR-MCNC: 121 MG/DL — HIGH (ref 70–99)
GLUCOSE BLDC GLUCOMTR-MCNC: 180 MG/DL — HIGH (ref 70–99)
HBA1C BLD-MCNC: 7.1 % — HIGH (ref 4–5.6)

## 2020-03-06 PROCEDURE — 93010 ELECTROCARDIOGRAM REPORT: CPT

## 2020-03-06 PROCEDURE — C1769: CPT

## 2020-03-06 PROCEDURE — 99238 HOSP IP/OBS DSCHRG MGMT 30/<: CPT

## 2020-03-06 PROCEDURE — 99152 MOD SED SAME PHYS/QHP 5/>YRS: CPT

## 2020-03-06 PROCEDURE — C1874: CPT

## 2020-03-06 PROCEDURE — 93005 ELECTROCARDIOGRAM TRACING: CPT

## 2020-03-06 PROCEDURE — 83036 HEMOGLOBIN GLYCOSYLATED A1C: CPT

## 2020-03-06 PROCEDURE — 99153 MOD SED SAME PHYS/QHP EA: CPT

## 2020-03-06 PROCEDURE — C1894: CPT

## 2020-03-06 PROCEDURE — 85027 COMPLETE CBC AUTOMATED: CPT

## 2020-03-06 PROCEDURE — C1887: CPT

## 2020-03-06 PROCEDURE — C9600: CPT | Mod: LD

## 2020-03-06 PROCEDURE — 93458 L HRT ARTERY/VENTRICLE ANGIO: CPT | Mod: 59

## 2020-03-06 PROCEDURE — 82962 GLUCOSE BLOOD TEST: CPT

## 2020-03-06 PROCEDURE — 80048 BASIC METABOLIC PNL TOTAL CA: CPT

## 2020-03-06 RX ORDER — ATORVASTATIN CALCIUM 80 MG/1
80 TABLET, FILM COATED ORAL AT BEDTIME
Refills: 0 | Status: DISCONTINUED | OUTPATIENT
Start: 2020-03-06 | End: 2020-03-06

## 2020-03-06 RX ORDER — ATORVASTATIN CALCIUM 80 MG/1
1 TABLET, FILM COATED ORAL
Qty: 30 | Refills: 0
Start: 2020-03-06 | End: 2020-04-04

## 2020-03-06 RX ORDER — ASPIRIN/CALCIUM CARB/MAGNESIUM 324 MG
1 TABLET ORAL
Qty: 30 | Refills: 0
Start: 2020-03-06 | End: 2020-04-04

## 2020-03-06 RX ORDER — METOPROLOL TARTRATE 50 MG
0.5 TABLET ORAL
Qty: 30 | Refills: 0
Start: 2020-03-06 | End: 2020-04-04

## 2020-03-06 RX ORDER — METOPROLOL TARTRATE 50 MG
12.5 TABLET ORAL
Refills: 0 | Status: DISCONTINUED | OUTPATIENT
Start: 2020-03-06 | End: 2020-03-06

## 2020-03-06 RX ORDER — CLOPIDOGREL BISULFATE 75 MG/1
1 TABLET, FILM COATED ORAL
Qty: 90 | Refills: 3
Start: 2020-03-06 | End: 2021-02-28

## 2020-03-06 RX ORDER — CHOLECALCIFEROL (VITAMIN D3) 125 MCG
0 CAPSULE ORAL
Qty: 0 | Refills: 0 | DISCHARGE

## 2020-03-06 RX ORDER — ATORVASTATIN CALCIUM 80 MG/1
1 TABLET, FILM COATED ORAL
Qty: 0 | Refills: 0 | DISCHARGE

## 2020-03-06 RX ORDER — METFORMIN HYDROCHLORIDE 850 MG/1
1 TABLET ORAL
Qty: 0 | Refills: 0 | DISCHARGE

## 2020-03-06 RX ADMIN — CLOPIDOGREL BISULFATE 75 MILLIGRAM(S): 75 TABLET, FILM COATED ORAL at 11:59

## 2020-03-06 RX ADMIN — Medication 12.5 MILLIGRAM(S): at 11:59

## 2020-03-06 RX ADMIN — Medication 81 MILLIGRAM(S): at 11:59

## 2020-03-06 RX ADMIN — Medication 2: at 09:11

## 2020-03-06 NOTE — DISCHARGE NOTE PROVIDER - NSDCMRMEDTOKEN_GEN_ALL_CORE_FT
None aspirin 81 mg oral delayed release tablet: 1 tab(s) orally once a day  atorvastatin 80 mg oral tablet: 1 tab(s) orally once a day (at bedtime)  Humira Pen 40 mg/0.8 mL subcutaneous kit: every two weeks  metFORMIN 500 mg oral tablet: 1 tab(s) orally 2 times a day - do NOT take until Frankie 3/8 due to IV contrast with angiogram interaction  metoprolol tartrate 25 mg oral tablet: 0.5 tab(s) = 12.5 ,mg  orally 2 times a day   Plavix 75 mg oral tablet: 1 tab(s) orally once a day   Vitamin D3: 1 tab(s) orally once a day

## 2020-03-06 NOTE — DISCHARGE NOTE PROVIDER - NSDCCPCAREPLAN_GEN_ALL_CORE_FT
PRINCIPAL DISCHARGE DIAGNOSIS  Diagnosis: Shortness of breath  Assessment and Plan of Treatment: cardiac cath on 3/5 w/ NIVIA stent x 1  Coronary artery disease is a condition where the arteries the supply the heart muscle get clogges with fatty deposits & puts you at risk for a heart attack  Call your doctor if you have any new pain, pressure, or discomfort in the center of your chest, pain, tingling or discomfort in arms, back, neck, jaw, or stomach, shortness of breath, nausea, vomiting, burping or heartburn, sweating, cold and clammy skin, racing or abnormal heartbeat for more than 10 minutes or if they keep coming & going.  Call 911 and do not tr to get to hospital by care  You can help yourself with lefestyle changes (quitting smoking if you smoke), eat lots of fruits & vegetables & low fat dairy products, not a lot of meat & fatty foods, walk or some form of physical activity most days of the week, lose weight if you are overweight  Take your cardiac medication as prescribed to lower cholesterol, to lower blood pressure, aspirin to prevent blood clots, and diabetes control  Make sure to keep appointments with doctor for cardiac follow up care  follow up with PCP/cardiology Dr. Richards within one week after discharge        SECONDARY DISCHARGE DIAGNOSES  Diagnosis: Diabetes mellitus  Assessment and Plan of Treatment: HgA1C this admission was 7.1 %  Make sure you get your HgA1c checked every three months.  If you take insulin, check your blood glucose before meals and at bedtime.  It's important not to skip any meals.  Keep a log of your blood glucose results and always take it with you to your doctor appointments.  Keep a list of your current medications including injectables and over the counter medications and bring this medication list with you to all your doctor appointments.  If you have not seen your opthalmologist this year call for appointment.  Check your feet daily for redness, sores, or openings. Do not self treat. If no improvement in two days call your primary care physician for an appointment.  Low blood sugar (hypoglycemia) is a blood sugar below 70mg/dl. Check your blood sugar if you feel signs/symptoms of hypoglycemia. If your blood sugar is below 70 take 15 grams of carbohydrates (ex 4 oz of apple juice, 3-4 glucosr tablets, or 4-6 oz of regular soda) wait 15 minutes and repeat blood sugar to make sure it comes up above 70.  If your blood sugar is above 70 and you are due for a meal, have a meal.  If you are not due for a meal have a snack.  This snack helps keeps your blood sugar at a safe range.  Do NOT restart Metformin until Frankie 3/8 due to IV contrast interaction given w/ angiogram      Diagnosis: Obesity  Assessment and Plan of Treatment: weight management program   outpatient sleep study since obstructive sleep apnea can affect your cardiac and pulmonary systems PRINCIPAL DISCHARGE DIAGNOSIS  Diagnosis: Shortness of breath  Assessment and Plan of Treatment: cardiac cath on 3/5 w/ NIVIA stent x 1  Coronary artery disease is a condition where the arteries the supply the heart muscle get clogges with fatty deposits & puts you at risk for a heart attack  Call your doctor if you have any new pain, pressure, or discomfort in the center of your chest, pain, tingling or discomfort in arms, back, neck, jaw, or stomach, shortness of breath, nausea, vomiting, burping or heartburn, sweating, cold and clammy skin, racing or abnormal heartbeat for more than 10 minutes or if they keep coming & going.  Call 911 and do not tr to get to hospital by care  You can help yourself with lefestyle changes (quitting smoking if you smoke), eat lots of fruits & vegetables & low fat dairy products, not a lot of meat & fatty foods, walk or some form of physical activity most days of the week, lose weight if you are overweight  Take your cardiac medication as prescribed to lower cholesterol, to lower blood pressure, aspirin to prevent blood clots, and diabetes control  Make sure to keep appointments with doctor for cardiac follow up care  follow up with PCP/cardiology Dr. Richards within one week after discharge  discuss starting ACE inhibitor w/ Dr. Richards as outpatient        SECONDARY DISCHARGE DIAGNOSES  Diagnosis: Diabetes mellitus  Assessment and Plan of Treatment: HgA1C this admission was 7.1 %  Make sure you get your HgA1c checked every three months.  If you take insulin, check your blood glucose before meals and at bedtime.  It's important not to skip any meals.  Keep a log of your blood glucose results and always take it with you to your doctor appointments.  Keep a list of your current medications including injectables and over the counter medications and bring this medication list with you to all your doctor appointments.  If you have not seen your opthalmologist this year call for appointment.  Check your feet daily for redness, sores, or openings. Do not self treat. If no improvement in two days call your primary care physician for an appointment.  Low blood sugar (hypoglycemia) is a blood sugar below 70mg/dl. Check your blood sugar if you feel signs/symptoms of hypoglycemia. If your blood sugar is below 70 take 15 grams of carbohydrates (ex 4 oz of apple juice, 3-4 glucosr tablets, or 4-6 oz of regular soda) wait 15 minutes and repeat blood sugar to make sure it comes up above 70.  If your blood sugar is above 70 and you are due for a meal, have a meal.  If you are not due for a meal have a snack.  This snack helps keeps your blood sugar at a safe range.  Do NOT restart Metformin until Frankie 3/8 due to IV contrast interaction given w/ angiogram      Diagnosis: Obesity  Assessment and Plan of Treatment: weight management program   outpatient sleep study since obstructive sleep apnea can affect your cardiac and pulmonary systems

## 2020-03-06 NOTE — CHART NOTE - NSCHARTNOTEFT_GEN_A_CORE
Nutrition Chart Note.    Pt seen for nutrition consult.  Consult warranted for T2DM discharge education.    Spoke with pt and wife. Pt notes he recently has been Dx with DM; A1c 7.1%. Provided extensive consistent CHO diet education.  Reviewed foods containing CHO, portion sizes of CHO, CHO counting, pairing CHO with proteins, reading food labels, monitoring blood glucose levels, not skipping meals. Reviewed S&S and treatment of hypoglycemia. Reviewed meal/snack options. Pt provided with literature on "Type 2 Diabetes Nutrition Therapy" and "Carbohydrate Label Reading". Pt and wife without any further nutrition-related questions at this time. Made aware RD remains available for additional information/questions PRN.    RD remains available   Erma Crockett, MS, RD, CDN  pager #[phone]

## 2020-03-06 NOTE — DISCHARGE NOTE PROVIDER - NSDCFUSCHEDAPPT_GEN_ALL_CORE_FT
SELENE JUSTICE ; 03/20/2020 ; [facility] [address]  SELENE JUSTICE ; 03/26/2020 ; [facility] [address] [facility]  SELENE JUSTICE ; 03/26/2020 ; [facility] [address]  SELENE JUSTICE ; 03/26/2020 ; [facility] [address] SELENE JUSTICE ; 03/20/2020 ; [facility] [address]  SELENE JUSTICE ; 03/26/2020 ; [facility] [address] [facility]  SELENE JUSTICE ; 03/26/2020 ; [facility] [address] [place]  SELENE JUSTICE ; 03/26/2020 ; [facility] [address]

## 2020-03-06 NOTE — PROGRESS NOTE ADULT - SUBJECTIVE AND OBJECTIVE BOX
Denies CP, SOB, palp, dizziness. No events overnight.    MEDICATIONS:  aspirin enteric coated 81 milliGRAM(s) Oral daily  clopidogrel Tablet 75 milliGRAM(s) Oral daily  atorvastatin 20 milliGRAM(s) Oral at bedtime  dextrose 40% Gel 15 Gram(s) Oral once PRN  dextrose 50% Injectable 12.5 Gram(s) IV Push once  dextrose 50% Injectable 25 Gram(s) IV Push once  dextrose 50% Injectable 25 Gram(s) IV Push once  glucagon  Injectable 1 milliGRAM(s) IntraMuscular once PRN  insulin lispro (HumaLOG) corrective regimen sliding scale   SubCutaneous three times a day before meals  insulin lispro (HumaLOG) corrective regimen sliding scale   SubCutaneous at bedtime  dextrose 5%. 1000 milliLiter(s) IV Continuous <Continuous>      REVIEW OF SYSTEMS:    CONSTITUTIONAL: No weakness, fevers or chills  EYES/ENT: No visual changes;  No dysphagia  RESPIRATORY: No cough, wheezing, hemoptysis; No shortness of breath  CARDIOVASCULAR: No chest pain or palpitations; No lower extremity edema  GASTROINTESTINAL: No abdominal or epigastric pain. No nausea, vomiting, or hematemesis  GENITOURINARY: No dysuria, frequency or hematuria  NEUROLOGICAL: No numbness or weakness  SKIN: No itching, burning, rashes, or lesions   HEME: No bleeding or bruising  MSK: No joint pains or muscle pains  All other review of systems is negative unless indicated above.    PHYSICAL EXAM:  T(C): 36.6 (03-06-20 @ 04:42), Max: 36.8 (03-05-20 @ 14:01)  HR: 59 (03-06-20 @ 04:42) (59 - 71)  BP: 138/76 (03-06-20 @ 04:42) (134/63 - 154/80)  RR: 16 (03-06-20 @ 04:42) (14 - 16)  SpO2: 95% (03-06-20 @ 04:42) (92% - 95%)  Wt(kg): --  I&O's Summary    05 Mar 2020 07:01  -  06 Mar 2020 07:00  --------------------------------------------------------  IN: 340 mL / OUT: 0 mL / NET: 340 mL        Appearance: Normal	  HEENT:   Normal oral mucosa  Cardiovascular: Normal S1 S2, No JVD, No murmurs, No edema  Respiratory: Lungs clear to auscultation	  Psychiatry: A & O x 3, Mood & affect appropriate  Gastrointestinal:  Soft, Non-tender, + BS	  Skin: No rashes, No ecchymoses, No cyanosis	  Neurologic: Non-focal  Extremities: Radial site w/o hematoma, non tender, pulses intact    LABS:	 	    CBC Full  -  ( 05 Mar 2020 14:15 )  WBC Count : 9.47 K/uL  Hemoglobin : 15.3 g/dL  Hematocrit : 46.5 %  Platelet Count - Automated : 230 K/uL  Mean Cell Volume : 87.9 fl  Mean Cell Hemoglobin : 28.9 pg  Mean Cell Hemoglobin Concentration : 32.9 gm/dL  Auto Neutrophil # : x  Auto Lymphocyte # : x  Auto Monocyte # : x  Auto Eosinophil # : x  Auto Basophil # : x  Auto Neutrophil % : x  Auto Lymphocyte % : x  Auto Monocyte % : x  Auto Eosinophil % : x  Auto Basophil % : x    03-05    142  |  100  |  13  ----------------------------<  137<H>  4.1   |  27  |  0.78    Ca    9.4      05 Mar 2020 14:15    ASSESSMENT/PLAN: 	  55 y/o  male with DM2, HLD, SVT, s/p Ablation, CONCEPCIÓN uses CPAP, OA, Psoriasis and abnormal stress test s/p cath with PCI to LAD.     - R radial site w/o hematoma, no tenderness, pulses intact  - c/w asa, plavix  - start low dose BB, metop tart 12.5mg BID  - increase atorvastatin to 80mg daily  - ok to d/c, f/u with outpatient cards in 1 week      Jessica Stoddard MD  Cardiology Fellow, M-F 7:30A-5P  [phone]    All Cardiology service information can be found on [email or web address], password: [email or web address].

## 2020-03-06 NOTE — DISCHARGE NOTE PROVIDER - CARE PROVIDER_API CALL
Bryce Richards)  Cardiology; Internal Medicine  [address] [facility], [address]  [place] [address]  Phone: [phone]  Fax: [phone]  Follow Up Time:

## 2020-03-06 NOTE — DISCHARGE NOTE NURSING/CASE MANAGEMENT/SOCIAL WORK - PATIENT PORTAL LINK FT
You can access the FollowMyHealth Patient Portal offered by [facility] by registering at the following website: [email or web address]. By joining [employer]’s FollowMyHealth portal, you will also be able to view your health information using other applications (apps) compatible with our system.

## 2020-03-06 NOTE — PHARMACOTHERAPY INTERVENTION NOTE - COMMENTS
Patient was provided with a medication card for their new medication describing brand/generic name, indication, and possible side effects. Patient was educated on both Aspirin 81 mg and Clopidogrel 75 mg, I emphasized the importance of taking both medications every day as well as avoiding NSAIDs.     Baljit Candelaria, Pharmacy Patient was provided with a medication card for their new medications describing brand/generic name, indication, and possible side effects. Patient was educated on new medications aspirin 81 mg and clopidogrel 75 mg. Emphasized the importance of taking both medications every day as well as avoiding NSAIDs.     Baljit Candelaria, Pharmacy    Basilia Torres, PharmD   [phone] Patient was provided with a medication card for their new medications describing brand/generic name, indication, and possible side effects. Patient was educated on new medications aspirin 81 mg, clopidogrel 75 mg, metoprolol 12.5 mg, and increased dose of atorvastatin. Emphasized the importance of taking both medications every day as well as avoiding NSAIDs.     Baljit Candelaria, Pharmacy    Basilia Torres, PharmD   [phone]

## 2020-03-06 NOTE — DISCHARGE NOTE PROVIDER - HOSPITAL COURSE
shortness of breath - positive stress test & cardiac cath with NIVIA stent pLAD x 1        53 y/o  male with DM2, HLD, SVT, s/p Ablation, CONCEPCIÓN uses CPAP, OA, Psoriasis and abnormal stress test s/p cath with PCI to LAD.         - R radial site w/o hematoma, no tenderness, pulses intact    - c/w asa, plavix    - start low dose BB, metop tart 12.5mg BID    - increase atorvastatin to 80mg daily    - ok to d/c, f/u with outpatient cards in 1 week

## 2020-03-08 LAB
25(OH)D3 SERPL-MCNC: 44.5 NG/ML
ALBUMIN SERPL ELPH-MCNC: 4.5 G/DL
ALP BLD-CCNC: 62 U/L
ALT SERPL-CCNC: 27 U/L
ANION GAP SERPL CALC-SCNC: 15 MMOL/L
AST SERPL-CCNC: 25 U/L
BASOPHILS # BLD AUTO: 0.11 K/UL
BASOPHILS NFR BLD AUTO: 1.3 %
BILIRUB DIRECT SERPL-MCNC: 0.1 MG/DL
BILIRUB INDIRECT SERPL-MCNC: 0.3 MG/DL
BILIRUB SERPL-MCNC: 0.4 MG/DL
BUN SERPL-MCNC: 11 MG/DL
CALCIUM SERPL-MCNC: 10 MG/DL
CHLORIDE SERPL-SCNC: 100 MMOL/L
CHOLEST SERPL-MCNC: 131 MG/DL
CHOLEST/HDLC SERPL: 3.9 RATIO
CK SERPL-CCNC: 280 U/L
CO2 SERPL-SCNC: 28 MMOL/L
CREAT SERPL-MCNC: 0.74 MG/DL
EOSINOPHIL # BLD AUTO: 0.23 K/UL
EOSINOPHIL NFR BLD AUTO: 2.7 %
ESTIMATED AVERAGE GLUCOSE: 163 MG/DL
GLUCOSE SERPL-MCNC: 154 MG/DL
HBA1C MFR BLD HPLC: 7.3 %
HCT VFR BLD CALC: 45.6 %
HDLC SERPL-MCNC: 34 MG/DL
HGB BLD-MCNC: 15.3 G/DL
IMM GRANULOCYTES NFR BLD AUTO: 0.1 %
LDLC SERPL CALC-MCNC: 68 MG/DL
LYMPHOCYTES # BLD AUTO: 3.42 K/UL
LYMPHOCYTES NFR BLD AUTO: 39.9 %
MAN DIFF?: NORMAL
MCHC RBC-ENTMCNC: 29.5 PG
MCHC RBC-ENTMCNC: 33.6 GM/DL
MCV RBC AUTO: 87.9 FL
MONOCYTES # BLD AUTO: 0.89 K/UL
MONOCYTES NFR BLD AUTO: 10.4 %
NEUTROPHILS # BLD AUTO: 3.91 K/UL
NEUTROPHILS NFR BLD AUTO: 45.6 %
PLATELET # BLD AUTO: 237 K/UL
POTASSIUM SERPL-SCNC: 4.6 MMOL/L
PROT SERPL-MCNC: 7.4 G/DL
RBC # BLD: 5.19 M/UL
RBC # FLD: 13.2 %
SODIUM SERPL-SCNC: 143 MMOL/L
TRIGL SERPL-MCNC: 146 MG/DL
WBC # FLD AUTO: 8.57 K/UL

## 2020-03-09 PROBLEM — G47.33 OBSTRUCTIVE SLEEP APNEA (ADULT) (PEDIATRIC): Chronic | Status: ACTIVE | Noted: 2020-03-05

## 2020-03-09 PROBLEM — M19.90 UNSPECIFIED OSTEOARTHRITIS, UNSPECIFIED SITE: Chronic | Status: ACTIVE | Noted: 2020-03-05

## 2020-03-09 PROBLEM — E11.9 TYPE 2 DIABETES MELLITUS WITHOUT COMPLICATIONS: Chronic | Status: ACTIVE | Noted: 2020-03-05

## 2020-03-09 PROBLEM — E55.9 VITAMIN D DEFICIENCY, UNSPECIFIED: Chronic | Status: ACTIVE | Noted: 2020-03-05

## 2020-03-09 PROBLEM — I47.1 SUPRAVENTRICULAR TACHYCARDIA: Chronic | Status: ACTIVE | Noted: 2020-03-05

## 2020-03-09 PROBLEM — L40.9 PSORIASIS, UNSPECIFIED: Chronic | Status: ACTIVE | Noted: 2020-03-05

## 2020-03-09 PROBLEM — E78.5 HYPERLIPIDEMIA, UNSPECIFIED: Chronic | Status: ACTIVE | Noted: 2020-03-05

## 2020-03-13 ENCOUNTER — APPOINTMENT (OUTPATIENT)
Dept: CARDIOLOGY | Facility: CLINIC | Age: 55
End: 2020-03-13
Payer: COMMERCIAL

## 2020-03-13 ENCOUNTER — NON-APPOINTMENT (OUTPATIENT)
Age: 55
End: 2020-03-13

## 2020-03-13 VITALS
HEART RATE: 64 BPM | SYSTOLIC BLOOD PRESSURE: 130 MMHG | BODY MASS INDEX: 42.14 KG/M2 | TEMPERATURE: 98.1 F | OXYGEN SATURATION: 96 % | DIASTOLIC BLOOD PRESSURE: 70 MMHG | HEIGHT: 71 IN | WEIGHT: 301 LBS

## 2020-03-13 PROCEDURE — 93000 ELECTROCARDIOGRAM COMPLETE: CPT

## 2020-03-13 PROCEDURE — 99213 OFFICE O/P EST LOW 20 MIN: CPT

## 2020-03-13 NOTE — PHYSICAL EXAM
[General Appearance - Well Developed] : well developed [Normal Appearance] : normal appearance [Well Groomed] : well groomed [General Appearance - Well Nourished] : well nourished [No Deformities] : no deformities [General Appearance - In No Acute Distress] : no acute distress [Normal Conjunctiva] : the conjunctiva exhibited no abnormalities [Eyelids - No Xanthelasma] : the eyelids demonstrated no xanthelasmas [Normal Oral Mucosa] : normal oral mucosa [No Oral Pallor] : no oral pallor [No Oral Cyanosis] : no oral cyanosis [Normal Jugular Venous A Waves Present] : normal jugular venous A waves present [Normal Jugular Venous V Waves Present] : normal jugular venous V waves present [No Jugular Venous Reis A Waves] : no jugular venous reis A waves [Respiration, Rhythm And Depth] : normal respiratory rhythm and effort [Exaggerated Use Of Accessory Muscles For Inspiration] : no accessory muscle use [Auscultation Breath Sounds / Voice Sounds] : lungs were clear to auscultation bilaterally [Heart Rate And Rhythm] : heart rate and rhythm were normal [Heart Sounds] : normal S1 and S2 [Murmurs] : no murmurs present [Abdomen Soft] : soft [Abdomen Tenderness] : non-tender [Abdomen Mass (___ Cm)] : no abdominal mass palpated [Abnormal Walk] : normal gait [Gait - Sufficient For Exercise Testing] : the gait was sufficient for exercise testing [Nail Clubbing] : no clubbing of the fingernails [Cyanosis, Localized] : no localized cyanosis [Petechial Hemorrhages (___cm)] : no petechial hemorrhages [Skin Color & Pigmentation] : normal skin color and pigmentation [] : no rash [No Venous Stasis] : no venous stasis [Skin Lesions] : no skin lesions [No Skin Ulcers] : no skin ulcer [No Xanthoma] : no  xanthoma was observed [Oriented To Time, Place, And Person] : oriented to person, place, and time [Affect] : the affect was normal [Mood] : the mood was normal [No Anxiety] : not feeling anxious [FreeTextEntry1] : site of right radial artery cath intact

## 2020-03-13 NOTE — HISTORY OF PRESENT ILLNESS
[FreeTextEntry1] : pt presents for f/u s/p ptci lad pt feels well .pt doing well pt with htn /dm pt denies any chest  pain dizziness ,lightheadedness ,nausea vomiting diaphoresis\par

## 2020-03-14 LAB
ANION GAP SERPL CALC-SCNC: 14 MMOL/L
BASOPHILS # BLD AUTO: 0.1 K/UL
BASOPHILS NFR BLD AUTO: 1.1 %
BUN SERPL-MCNC: 14 MG/DL
CALCIUM SERPL-MCNC: 9.7 MG/DL
CHLORIDE SERPL-SCNC: 100 MMOL/L
CHOLEST SERPL-MCNC: 108 MG/DL
CHOLEST/HDLC SERPL: 3.7 RATIO
CK SERPL-CCNC: 322 U/L
CO2 SERPL-SCNC: 26 MMOL/L
CREAT SERPL-MCNC: 0.76 MG/DL
EOSINOPHIL # BLD AUTO: 0.27 K/UL
EOSINOPHIL NFR BLD AUTO: 3.1 %
GLUCOSE SERPL-MCNC: 195 MG/DL
HCT VFR BLD CALC: 46.9 %
HDLC SERPL-MCNC: 29 MG/DL
HGB BLD-MCNC: 15.3 G/DL
IMM GRANULOCYTES NFR BLD AUTO: 0.2 %
LDLC SERPL CALC-MCNC: 38 MG/DL
LDLC SERPL DIRECT ASSAY-MCNC: 53 MG/DL
LYMPHOCYTES # BLD AUTO: 2.9 K/UL
LYMPHOCYTES NFR BLD AUTO: 32.9 %
MAN DIFF?: NORMAL
MCHC RBC-ENTMCNC: 29.9 PG
MCHC RBC-ENTMCNC: 32.6 GM/DL
MCV RBC AUTO: 91.8 FL
MONOCYTES # BLD AUTO: 0.68 K/UL
MONOCYTES NFR BLD AUTO: 7.7 %
NEUTROPHILS # BLD AUTO: 4.85 K/UL
NEUTROPHILS NFR BLD AUTO: 55 %
PLATELET # BLD AUTO: 218 K/UL
POTASSIUM SERPL-SCNC: 4.6 MMOL/L
RBC # BLD: 5.11 M/UL
RBC # FLD: 13.3 %
SODIUM SERPL-SCNC: 140 MMOL/L
TRIGL SERPL-MCNC: 208 MG/DL
WBC # FLD AUTO: 8.82 K/UL

## 2020-03-20 ENCOUNTER — APPOINTMENT (OUTPATIENT)
Dept: CARDIOLOGY | Facility: CLINIC | Age: 55
End: 2020-03-20

## 2020-03-26 ENCOUNTER — APPOINTMENT (OUTPATIENT)
Dept: CARDIOLOGY | Facility: CLINIC | Age: 55
End: 2020-03-26
Payer: COMMERCIAL

## 2020-03-26 ENCOUNTER — APPOINTMENT (OUTPATIENT)
Dept: ENDOCRINOLOGY | Facility: CLINIC | Age: 55
End: 2020-03-26
Payer: COMMERCIAL

## 2020-03-26 VITALS
WEIGHT: 310 LBS | TEMPERATURE: 98.4 F | HEIGHT: 71 IN | HEART RATE: 63 BPM | OXYGEN SATURATION: 94 % | BODY MASS INDEX: 43.4 KG/M2 | SYSTOLIC BLOOD PRESSURE: 126 MMHG | DIASTOLIC BLOOD PRESSURE: 80 MMHG

## 2020-03-26 PROCEDURE — 93925 LOWER EXTREMITY STUDY: CPT

## 2020-03-26 PROCEDURE — 93880 EXTRACRANIAL BILAT STUDY: CPT

## 2020-03-26 PROCEDURE — 99214 OFFICE O/P EST MOD 30 MIN: CPT

## 2020-03-27 NOTE — HISTORY OF PRESENT ILLNESS
[FreeTextEntry1] : Mr. JUSTICE   is a 54 year year old  male who  returns today in follow up with regard to a history of type 2 diabetes mellitus. The diabetes mellitus was just dx recently There is no known history of retinopathy, nephropathy. He  too denies any history of neuropathy. Current dm medication include   Metfromin 500 mg bid . HGM of late has shown values to be running  160's in am .There has been no significant hypoglycemia.  He  denies any chest pain, sob, neurologic or ophthalmologic complaints. He  too denies any new podiatric concerns. He  is up to date with his ophthalmologic visit. Diet has been  of late and re physical activity/exercise, patient is\par Additional medical history includes that of  obesity incr chol\par Sent for card cath\par Had recent  coronary stent now on plavix and and and metoprolol\par Opho neg recently.\par Vit d 44 taken daily otc.\par \par \par

## 2020-03-28 ENCOUNTER — RX RENEWAL (OUTPATIENT)
Age: 55
End: 2020-03-28

## 2020-04-03 ENCOUNTER — NON-APPOINTMENT (OUTPATIENT)
Age: 55
End: 2020-04-03

## 2020-04-03 ENCOUNTER — APPOINTMENT (OUTPATIENT)
Dept: CARDIOLOGY | Facility: CLINIC | Age: 55
End: 2020-04-03
Payer: COMMERCIAL

## 2020-04-03 VITALS
HEIGHT: 71 IN | OXYGEN SATURATION: 95 % | BODY MASS INDEX: 42.7 KG/M2 | SYSTOLIC BLOOD PRESSURE: 116 MMHG | WEIGHT: 305 LBS | HEART RATE: 66 BPM | TEMPERATURE: 98 F | DIASTOLIC BLOOD PRESSURE: 80 MMHG

## 2020-04-03 PROCEDURE — 93000 ELECTROCARDIOGRAM COMPLETE: CPT

## 2020-04-03 PROCEDURE — 99213 OFFICE O/P EST LOW 20 MIN: CPT

## 2020-04-03 NOTE — HISTORY OF PRESENT ILLNESS
[FreeTextEntry1] : pt presents for f/u pt feels well s/p ptci lad 3/6/20 .pt with resolved dyspnea .pt with dm s/p recent eval with dr zhong pt denies any chest  pain dizziness ,lightheadedness ,nausea vomiting diaphoresis\par

## 2020-04-03 NOTE — PHYSICAL EXAM
[General Appearance - Well Developed] : well developed [Normal Appearance] : normal appearance [Well Groomed] : well groomed [General Appearance - Well Nourished] : well nourished [No Deformities] : no deformities [General Appearance - In No Acute Distress] : no acute distress [Normal Conjunctiva] : the conjunctiva exhibited no abnormalities [Eyelids - No Xanthelasma] : the eyelids demonstrated no xanthelasmas [Normal Oral Mucosa] : normal oral mucosa [No Oral Pallor] : no oral pallor [No Oral Cyanosis] : no oral cyanosis [Normal Jugular Venous A Waves Present] : normal jugular venous A waves present [Normal Jugular Venous V Waves Present] : normal jugular venous V waves present [No Jugular Venous Reis A Waves] : no jugular venous reis A waves [Respiration, Rhythm And Depth] : normal respiratory rhythm and effort [Exaggerated Use Of Accessory Muscles For Inspiration] : no accessory muscle use [Auscultation Breath Sounds / Voice Sounds] : lungs were clear to auscultation bilaterally [Heart Rate And Rhythm] : heart rate and rhythm were normal [Heart Sounds] : normal S1 and S2 [Murmurs] : no murmurs present [Abdomen Soft] : soft [Abdomen Tenderness] : non-tender [Abdomen Mass (___ Cm)] : no abdominal mass palpated [Abnormal Walk] : normal gait [Gait - Sufficient For Exercise Testing] : the gait was sufficient for exercise testing [Nail Clubbing] : no clubbing of the fingernails [Cyanosis, Localized] : no localized cyanosis [Petechial Hemorrhages (___cm)] : no petechial hemorrhages [Skin Color & Pigmentation] : normal skin color and pigmentation [] : no rash [No Venous Stasis] : no venous stasis [Skin Lesions] : no skin lesions [No Skin Ulcers] : no skin ulcer [No Xanthoma] : no  xanthoma was observed [Oriented To Time, Place, And Person] : oriented to person, place, and time [Affect] : the affect was normal [Mood] : the mood was normal [No Anxiety] : not feeling anxious

## 2020-06-05 ENCOUNTER — TRANSCRIPTION ENCOUNTER (OUTPATIENT)
Age: 55
End: 2020-06-05

## 2020-08-06 ENCOUNTER — LABORATORY RESULT (OUTPATIENT)
Age: 55
End: 2020-08-06

## 2020-08-06 ENCOUNTER — NON-APPOINTMENT (OUTPATIENT)
Age: 55
End: 2020-08-06

## 2020-08-06 ENCOUNTER — APPOINTMENT (OUTPATIENT)
Dept: CARDIOLOGY | Facility: CLINIC | Age: 55
End: 2020-08-06
Payer: COMMERCIAL

## 2020-08-06 VITALS
WEIGHT: 315 LBS | HEART RATE: 57 BPM | HEIGHT: 71 IN | SYSTOLIC BLOOD PRESSURE: 120 MMHG | TEMPERATURE: 97.5 F | OXYGEN SATURATION: 95 % | DIASTOLIC BLOOD PRESSURE: 70 MMHG | BODY MASS INDEX: 44.1 KG/M2

## 2020-08-06 VITALS — RESPIRATION RATE: 14 BRPM

## 2020-08-06 DIAGNOSIS — L40.9 PSORIASIS, UNSPECIFIED: ICD-10-CM

## 2020-08-06 DIAGNOSIS — Z71.89 OTHER SPECIFIED COUNSELING: ICD-10-CM

## 2020-08-06 PROCEDURE — 99214 OFFICE O/P EST MOD 30 MIN: CPT

## 2020-08-06 PROCEDURE — 93000 ELECTROCARDIOGRAM COMPLETE: CPT

## 2020-08-06 NOTE — HISTORY OF PRESENT ILLNESS
[FreeTextEntry1] : pt presents for f/u pt feels well .pt with htn /dm /cad .pt s/p ptci  lad 3/20 .pt feels well pt denies any chest  pain dizziness ,lightheadedness ,nausea vomiting diaphoresis\par .pt requests testing for covid antibodies

## 2020-08-06 NOTE — PHYSICAL EXAM
[Normal Conjunctiva] : the conjunctiva exhibited no abnormalities [Eyelids - No Xanthelasma] : the eyelids demonstrated no xanthelasmas [Normal Oral Mucosa] : normal oral mucosa [No Oral Cyanosis] : no oral cyanosis [No Oral Pallor] : no oral pallor [Normal Jugular Venous A Waves Present] : normal jugular venous A waves present [Normal Jugular Venous V Waves Present] : normal jugular venous V waves present [No Jugular Venous Reis A Waves] : no jugular venous reis A waves [Respiration, Rhythm And Depth] : normal respiratory rhythm and effort [Exaggerated Use Of Accessory Muscles For Inspiration] : no accessory muscle use [Auscultation Breath Sounds / Voice Sounds] : lungs were clear to auscultation bilaterally [Heart Rate And Rhythm] : heart rate and rhythm were normal [Heart Sounds] : normal S1 and S2 [Murmurs] : no murmurs present [Abdomen Soft] : soft [Abdomen Tenderness] : non-tender [Abdomen Mass (___ Cm)] : no abdominal mass palpated [Abnormal Walk] : normal gait [Gait - Sufficient For Exercise Testing] : the gait was sufficient for exercise testing [Nail Clubbing] : no clubbing of the fingernails [Cyanosis, Localized] : no localized cyanosis [Petechial Hemorrhages (___cm)] : no petechial hemorrhages [] : no ischemic changes [FreeTextEntry1] : psoriatic rash elbows  [Oriented To Time, Place, And Person] : oriented to person, place, and time [Affect] : the affect was normal [Mood] : the mood was normal [No Anxiety] : not feeling anxious

## 2020-08-15 RX ORDER — HALOBETASOL PROPIONATE 0.5 MG/G
0.05 CREAM TOPICAL
Qty: 1 | Refills: 0 | Status: ACTIVE | COMMUNITY
Start: 2020-08-06 | End: 1900-01-01

## 2020-08-23 ENCOUNTER — RX RENEWAL (OUTPATIENT)
Age: 55
End: 2020-08-23

## 2020-08-23 LAB
25(OH)D3 SERPL-MCNC: 46.9 NG/ML
ALBUMIN SERPL ELPH-MCNC: 4.5 G/DL
ALP BLD-CCNC: 63 U/L
ALT SERPL-CCNC: 32 U/L
ANION GAP SERPL CALC-SCNC: 16 MMOL/L
APPEARANCE: CLEAR
AST SERPL-CCNC: 30 U/L
BACTERIA: NEGATIVE
BASOPHILS # BLD AUTO: 0.08 K/UL
BASOPHILS NFR BLD AUTO: 1.4 %
BILIRUB SERPL-MCNC: 0.5 MG/DL
BILIRUBIN URINE: NEGATIVE
BLOOD URINE: NEGATIVE
BUN SERPL-MCNC: 12 MG/DL
CALCIUM SERPL-MCNC: 9.5 MG/DL
CHLORIDE SERPL-SCNC: 99 MMOL/L
CHOLEST SERPL-MCNC: 113 MG/DL
CHOLEST/HDLC SERPL: 3.6 RATIO
CK SERPL-CCNC: 334 U/L
CO2 SERPL-SCNC: 24 MMOL/L
COLOR: NORMAL
CREAT SERPL-MCNC: 0.65 MG/DL
CREAT SPEC-SCNC: 153 MG/DL
EOSINOPHIL # BLD AUTO: 0.36 K/UL
EOSINOPHIL NFR BLD AUTO: 6.2 %
ESTIMATED AVERAGE GLUCOSE: 192 MG/DL
GLUCOSE QUALITATIVE U: ABNORMAL
GLUCOSE SERPL-MCNC: 217 MG/DL
HBA1C MFR BLD HPLC: 8.3 %
HCT VFR BLD CALC: 43.5 %
HDLC SERPL-MCNC: 32 MG/DL
HGB BLD-MCNC: 14.1 G/DL
HYALINE CASTS: 1 /LPF
IMM GRANULOCYTES NFR BLD AUTO: 0.3 %
KETONES URINE: NEGATIVE
LDLC SERPL CALC-MCNC: 45 MG/DL
LDLC SERPL DIRECT ASSAY-MCNC: 57 MG/DL
LEUKOCYTE ESTERASE URINE: NEGATIVE
LYMPHOCYTES # BLD AUTO: 1.82 K/UL
LYMPHOCYTES NFR BLD AUTO: 31.3 %
MAN DIFF?: NORMAL
MCHC RBC-ENTMCNC: 29.1 PG
MCHC RBC-ENTMCNC: 32.4 GM/DL
MCV RBC AUTO: 89.7 FL
MICROALBUMIN 24H UR DL<=1MG/L-MCNC: <1.2 MG/DL
MICROALBUMIN/CREAT 24H UR-RTO: NORMAL MG/G
MICROSCOPIC-UA: NORMAL
MONOCYTES # BLD AUTO: 0.47 K/UL
MONOCYTES NFR BLD AUTO: 8.1 %
NEUTROPHILS # BLD AUTO: 3.06 K/UL
NEUTROPHILS NFR BLD AUTO: 52.7 %
NITRITE URINE: NEGATIVE
PH URINE: 6
PLATELET # BLD AUTO: 232 K/UL
POTASSIUM SERPL-SCNC: 4.4 MMOL/L
PROT SERPL-MCNC: 7.1 G/DL
PROTEIN URINE: NEGATIVE
RBC # BLD: 4.85 M/UL
RBC # FLD: 13.3 %
RED BLOOD CELLS URINE: 1 /HPF
SARS-COV-2 IGG SERPL IA-ACNC: <3.8 AU/ML
SARS-COV-2 IGG SERPL QL IA: NEGATIVE
SODIUM SERPL-SCNC: 139 MMOL/L
SPECIFIC GRAVITY URINE: 1.02
SQUAMOUS EPITHELIAL CELLS: 0 /HPF
T4 FREE SERPL-MCNC: 1.1 NG/DL
TRIGL SERPL-MCNC: 185 MG/DL
TSH SERPL-ACNC: 1.88 UIU/ML
UROBILINOGEN URINE: NORMAL
WBC # FLD AUTO: 5.81 K/UL
WHITE BLOOD CELLS URINE: 1 /HPF

## 2020-09-10 DIAGNOSIS — R89.9 UNSPECIFIED ABNORMAL FINDING IN SPECIMENS FROM OTHER ORGANS, SYSTEMS AND TISSUES: ICD-10-CM

## 2020-09-19 ENCOUNTER — RX RENEWAL (OUTPATIENT)
Age: 55
End: 2020-09-19

## 2020-12-10 ENCOUNTER — LABORATORY RESULT (OUTPATIENT)
Age: 55
End: 2020-12-10

## 2020-12-10 ENCOUNTER — NON-APPOINTMENT (OUTPATIENT)
Age: 55
End: 2020-12-10

## 2020-12-10 ENCOUNTER — APPOINTMENT (OUTPATIENT)
Dept: CARDIOLOGY | Facility: CLINIC | Age: 55
End: 2020-12-10
Payer: COMMERCIAL

## 2020-12-10 VITALS — WEIGHT: 315 LBS | BODY MASS INDEX: 44.35 KG/M2

## 2020-12-10 VITALS
DIASTOLIC BLOOD PRESSURE: 70 MMHG | SYSTOLIC BLOOD PRESSURE: 138 MMHG | OXYGEN SATURATION: 96 % | TEMPERATURE: 98.3 F | HEIGHT: 71 IN | HEART RATE: 64 BPM

## 2020-12-10 DIAGNOSIS — L81.2 FRECKLES: ICD-10-CM

## 2020-12-10 PROCEDURE — 99072 ADDL SUPL MATRL&STAF TM PHE: CPT

## 2020-12-10 PROCEDURE — 93000 ELECTROCARDIOGRAM COMPLETE: CPT

## 2020-12-10 PROCEDURE — 99396 PREV VISIT EST AGE 40-64: CPT

## 2020-12-10 NOTE — PHYSICAL EXAM
[General Appearance - Well Developed] : well developed [Normal Appearance] : normal appearance [Well Groomed] : well groomed [General Appearance - Well Nourished] : well nourished [No Deformities] : no deformities [General Appearance - In No Acute Distress] : no acute distress [Normal Conjunctiva] : the conjunctiva exhibited no abnormalities [Eyelids - No Xanthelasma] : the eyelids demonstrated no xanthelasmas [Normal Oral Mucosa] : normal oral mucosa [No Oral Pallor] : no oral pallor [No Oral Cyanosis] : no oral cyanosis [Normal Jugular Venous A Waves Present] : normal jugular venous A waves present [Normal Jugular Venous V Waves Present] : normal jugular venous V waves present [No Jugular Venous Reis A Waves] : no jugular venous reis A waves [Respiration, Rhythm And Depth] : normal respiratory rhythm and effort [Exaggerated Use Of Accessory Muscles For Inspiration] : no accessory muscle use [Auscultation Breath Sounds / Voice Sounds] : lungs were clear to auscultation bilaterally [Heart Rate And Rhythm] : heart rate and rhythm were normal [Heart Sounds] : normal S1 and S2 [Murmurs] : no murmurs present [Abdomen Soft] : soft [Abdomen Tenderness] : non-tender [Abdomen Mass (___ Cm)] : no abdominal mass palpated [Abnormal Walk] : normal gait [Gait - Sufficient For Exercise Testing] : the gait was sufficient for exercise testing [Nail Clubbing] : no clubbing of the fingernails [Cyanosis, Localized] : no localized cyanosis [Petechial Hemorrhages (___cm)] : no petechial hemorrhages [] : no ischemic changes [FreeTextEntry1] : freckles and skin tags noted  [Oriented To Time, Place, And Person] : oriented to person, place, and time [Affect] : the affect was normal [Mood] : the mood was normal [No Anxiety] : not feeling anxious

## 2020-12-10 NOTE — HISTORY OF PRESENT ILLNESS
[FreeTextEntry1] : pt presents for yearly physical pt feels well pt with hx of cad s/p ptci /dm /obesity pt with difficulty losing weight and requests consult for bariatric surgery .pt pending colonoscopy however on hold secondary to ptci pt denies any chest  pain dizziness ,lightheadedness ,nausea vomiting diaphoresis\par

## 2020-12-14 DIAGNOSIS — Z82.0 FAMILY HISTORY OF EPILEPSY AND OTHER DISEASES OF THE NERVOUS SYSTEM: ICD-10-CM

## 2020-12-17 ENCOUNTER — TRANSCRIPTION ENCOUNTER (OUTPATIENT)
Age: 55
End: 2020-12-17

## 2020-12-18 ENCOUNTER — APPOINTMENT (OUTPATIENT)
Dept: ENDOCRINOLOGY | Facility: CLINIC | Age: 55
End: 2020-12-18
Payer: COMMERCIAL

## 2020-12-18 VITALS
OXYGEN SATURATION: 96 % | BODY MASS INDEX: 44.1 KG/M2 | WEIGHT: 315 LBS | TEMPERATURE: 98.6 F | SYSTOLIC BLOOD PRESSURE: 122 MMHG | HEIGHT: 71 IN | RESPIRATION RATE: 16 BRPM | HEART RATE: 72 BPM | DIASTOLIC BLOOD PRESSURE: 72 MMHG

## 2020-12-18 PROCEDURE — 99214 OFFICE O/P EST MOD 30 MIN: CPT

## 2020-12-18 PROCEDURE — 99072 ADDL SUPL MATRL&STAF TM PHE: CPT

## 2020-12-18 RX ORDER — METFORMIN HYDROCHLORIDE 500 MG/1
500 TABLET, COATED ORAL
Qty: 270 | Refills: 3 | Status: DISCONTINUED | COMMUNITY
Start: 2019-10-14 | End: 2020-12-18

## 2020-12-21 ENCOUNTER — APPOINTMENT (OUTPATIENT)
Dept: SURGERY | Facility: CLINIC | Age: 55
End: 2020-12-21
Payer: COMMERCIAL

## 2020-12-21 VITALS
BODY MASS INDEX: 44.1 KG/M2 | WEIGHT: 315 LBS | DIASTOLIC BLOOD PRESSURE: 84 MMHG | HEIGHT: 71 IN | OXYGEN SATURATION: 95 % | SYSTOLIC BLOOD PRESSURE: 151 MMHG | RESPIRATION RATE: 16 BRPM | HEART RATE: 77 BPM | TEMPERATURE: 97.7 F

## 2020-12-21 DIAGNOSIS — Z00.00 ENCOUNTER FOR GENERAL ADULT MEDICAL EXAMINATION W/OUT ABNORMAL FINDINGS: ICD-10-CM

## 2020-12-21 PROCEDURE — 99072 ADDL SUPL MATRL&STAF TM PHE: CPT

## 2020-12-21 PROCEDURE — 99244 OFF/OP CNSLTJ NEW/EST MOD 40: CPT

## 2020-12-21 NOTE — REASON FOR VISIT
[Initial Consult] : an initial consult for [Morbid Obesity (BMI>40)] : morbid obesity (bmi>40) [FreeTextEntry2] : obesity, evaluation for bariatric surgery

## 2020-12-21 NOTE — HISTORY OF PRESENT ILLNESS
[de-identified] : Wayne is a 56 y/o male here for weight loss consultation. He has a history of CAD with PCI in the LAD on 3/2020 (on ASA/plavix), and Diabetes on metformin, and psoriatic arthritis on Humira. He is interested in bariatric surgery because he has struggled with his weight for many years, despite multiple weight loss programs. He has been able to lose 20-30 lbs at any one time, but always gained it back. He has noticed increasing medical problems associated with his weight, including cardiac disease, diabetes, and joint pain. He feels his food choices are good, but he struggles with portion sizes. He denies reflux symptoms, and has never had an upper endoscopy. \par \par Past medical history: Coronary artery disease status post PCI in March 2020, psoriatic arthritis, type 2 diabetes, morbid obesity\par Past surgical history: Knee surgery, denies any abdominal surgery\par Denies any history of dysphagia\par

## 2020-12-21 NOTE — PLAN
[FreeTextEntry1] : I have discussed my impression and treatment plan with the patient.  All surgical options were discussed including non-surgical treatments.  The patient would like to proceed with laparoscopic sleeve gastrectomy.  \par \par Benefits and risks of the planned surgery were discussed in depth, particularly leak, bleeding, sepsis, fistula, GERD, blood clots, dysphagia, malnutrition, weight regain, prolonged hospital stay and the remote possibility of death.   Also discussed was the importance of adhering to the recommended nutritional guidelines and supplements and attending regular follow-up.  I reviewed the critical importance of behavioral modification and follow-up in order to optimize outcomes and avoid complications. The patient was given the opportunity to ask pertinent questions and expressed good understanding of the aforementioned issues.\par \par Plan will be for laparoscopic sleeve gastrectomy after completion of:\par - medical weight management program\par - upper endoscopy\par - nutritional evaluation\par - medical, pulmonary and cardiac clearance\par - psychological evaluation\par \par He will need to be off Humira preoperatively.  Will have cardiac assessment preoperatively to see if Plavix can be stopped.

## 2020-12-21 NOTE — PHYSICAL EXAM
[Obese] : obese [Normal] : affect appropriate [de-identified] : Normal respiratory pattern [de-identified] : Normal rate and rhythm [de-identified] : Soft, Nondistended, Nontender

## 2020-12-21 NOTE — ASSESSMENT
[FreeTextEntry1] : 56yo M with h/o CAD s/p stent in LAD 3/2020, type 2 diabetes on metformin, psoriatric arthritis on Humira, presents for evaluation for bariatric surgery. Because his diabetes is well controlled on one oral medication and he denies symptoms of reflux, he will likely be a good candidate for a sleeve gastrectomy. He will undergo the standard pre-operative work up and monthly visits with us. \par \par The patient has failed multiple prior attempts at weight loss and is now dealing with the side effects, risk factors, and poor quality of life associated with morbid obesity.  They would benefit from surgical weight loss as outlined in the NIH and  ASMBS consensus statements on bariatric surgery.  Surgical intervention is medically indicated.\par \par My impression is that the patient is a reasonable candidate for laparoscopic sleeve gastrectomy.\par \par \par

## 2020-12-21 NOTE — CONSULT LETTER
[Dear  ___] : Dear  [unfilled], [Consult Letter:] : I had the pleasure of evaluating your patient, [unfilled]. [Please see my note below.] : Please see my note below. [Consult Closing:] : Thank you very much for allowing me to participate in the care of this patient.  If you have any questions, please do not hesitate to contact me. [Sincerely,] : Sincerely, [FreeTextEntry2] : Dr. Richards [FreeTextEntry3] : Kody Vazquez MD, FACS, FASMBS\par [facility]\par [address]\[place] [address]\par tel. [phone]\par fax [phone]\par  \par

## 2020-12-30 NOTE — HISTORY OF PRESENT ILLNESS
[FreeTextEntry1] : Mr. JUSTICE   is a 54 year year old  male who  returns today in follow up with regard to a history of type 2 diabetes mellitus. The diabetes mellitus was recently dx.  There is no known history of retinopathy, nephropathy. He  too denies any history of neuropathy. Current dm medication include   Metformin 500 mg two in am and one in pm HGM of late has shown values has not been tested of alt.  Prior am bg was 160-175 and hs was 190 range.     There has been no significant hypoglycemia.  He  denies any chest pain, sob, neurologic or ophthalmologic complaints. He  too denies any new podiatric concerns. He  is up to date with his ophthalmologic visit. \par Additional medical history includes that of  obesity and hyperlipidemia.\par Had recent  coronary stent now on plavix and and and metoprolol.  Too is on Atorvastatin 40 mg daily along with ASA 81 mg daily.\par He too continues on Humira for Psoriatic Arthritis.\par Opho neg recently.\par Vit d 44 taking daily otc.\par May consider gastric sleeve-to meet with Dr. Martinez next week.\par Sedentary -not exercising much.\par Did buy a bicycle for the outside -did use a little- not much lately.\par Optho within past 6 months-no retinopathy.\par \par \par

## 2021-01-22 ENCOUNTER — APPOINTMENT (OUTPATIENT)
Dept: SURGERY | Facility: CLINIC | Age: 56
End: 2021-01-22
Payer: COMMERCIAL

## 2021-01-22 VITALS
BODY MASS INDEX: 42.84 KG/M2 | WEIGHT: 306 LBS | TEMPERATURE: 96.5 F | OXYGEN SATURATION: 98 % | HEIGHT: 71 IN | SYSTOLIC BLOOD PRESSURE: 120 MMHG | HEART RATE: 62 BPM | DIASTOLIC BLOOD PRESSURE: 79 MMHG | RESPIRATION RATE: 15 BRPM

## 2021-01-22 PROCEDURE — 99212 OFFICE O/P EST SF 10 MIN: CPT

## 2021-01-22 PROCEDURE — 99072 ADDL SUPL MATRL&STAF TM PHE: CPT

## 2021-01-22 NOTE — HISTORY OF PRESENT ILLNESS
[de-identified] : This is a  55 yr old male here for a continuing medical weight management in preparation for planned laparoscopic sleeve gastrectomy. He was last seen on 12/21/20 and weighed 318 lbs, BMI 44.  12 pounds since the last visit by adhering to a low carb diet.  He otherwise reports no changes to his medical history.

## 2021-01-22 NOTE — PHYSICAL EXAM
[Obese, well nourished, in no acute distress] : obese, well nourished, in no acute distress [de-identified] : Normal respirations [Normal] : affect appropriate [de-identified] : soft, nontender, nondistended

## 2021-01-22 NOTE — ASSESSMENT
[FreeTextEntry1] : 55-year-old male with longstanding history of morbid obesity (BMI 43) and comorbidities of T2D, psoriatic arthritis on Humira, CAD s/p LAD stent presents for continuing medical weight management in preparation for planned laparoscopic sleeve gastrectomy.

## 2021-01-27 ENCOUNTER — RX RENEWAL (OUTPATIENT)
Age: 56
End: 2021-01-27

## 2021-01-29 ENCOUNTER — APPOINTMENT (OUTPATIENT)
Dept: ENDOCRINOLOGY | Facility: CLINIC | Age: 56
End: 2021-01-29
Payer: COMMERCIAL

## 2021-01-29 VITALS
HEIGHT: 71 IN | SYSTOLIC BLOOD PRESSURE: 123 MMHG | WEIGHT: 311 LBS | DIASTOLIC BLOOD PRESSURE: 82 MMHG | TEMPERATURE: 97.9 F | HEART RATE: 58 BPM | BODY MASS INDEX: 43.54 KG/M2 | OXYGEN SATURATION: 99 %

## 2021-01-29 LAB — GLUCOSE BLDC GLUCOMTR-MCNC: 181

## 2021-01-29 PROCEDURE — 82962 GLUCOSE BLOOD TEST: CPT

## 2021-01-29 PROCEDURE — 99214 OFFICE O/P EST MOD 30 MIN: CPT

## 2021-01-29 PROCEDURE — 99072 ADDL SUPL MATRL&STAF TM PHE: CPT

## 2021-01-29 RX ORDER — BLOOD SUGAR DIAGNOSTIC
STRIP MISCELLANEOUS
Qty: 3 | Refills: 1 | Status: ACTIVE | COMMUNITY
Start: 2021-01-29 | End: 1900-01-01

## 2021-01-29 RX ORDER — LANCETS 33 GAUGE
EACH MISCELLANEOUS
Qty: 3 | Refills: 0 | Status: ACTIVE | COMMUNITY
Start: 2021-01-29 | End: 1900-01-01

## 2021-01-29 NOTE — HISTORY OF PRESENT ILLNESS
[FreeTextEntry1] : Mr. JUSTICE   is a 54 year year old  male who  returns today in follow up with regard to a history of type 2 diabetes mellitus. The diabetes mellitus was dx about one year ago.  There is no known history of retinopathy, nephropathy. He  too denies any history of neuropathy. Current dm medication include Synjardy 5-1000 mg bid He stopped and he found hard time getting motivated-.  he did though restart Metfromin 500 mg 2 bid.HGM of late has shown values has not been tested of late-states meter broke.  Prior am bg was 160-175 and hs was 190 range.     There has been no significant hypoglycemia.  He  denies any chest pain, sob, neurologic or ophthalmologic complaints. He  too denies any new podiatric concerns. He  is up to date with his ophthalmologic visit. \par Additional medical history includes that of  obesity and hyperlipidemia.Does take otc vit d.\par Had recent  coronary stent now on plavix and and and metoprolol.  Too is on Atorvastatin 40 mg daily along with ASA 81 mg daily.\par He too continues on Humira for Psoriatic Arthritis.\par Opho neg recently.\par Vit d 44 taking daily otc.\par May consider gastric sleeve-to meet with Dr. Martinez next week.\par Sedentary -not exercising much.\par Did buy a bicycle for the outside -did use a little- not much lately.\par Optho within past few months-no retinopathy.\par Lost wt on the Jarediance about 12 lbs -has kept off.\par \par \par

## 2021-02-17 ENCOUNTER — TRANSCRIPTION ENCOUNTER (OUTPATIENT)
Age: 56
End: 2021-02-17

## 2021-02-22 ENCOUNTER — APPOINTMENT (OUTPATIENT)
Dept: SURGERY | Facility: CLINIC | Age: 56
End: 2021-02-22
Payer: COMMERCIAL

## 2021-02-22 VITALS — WEIGHT: 308 LBS | HEIGHT: 71 IN | BODY MASS INDEX: 43.12 KG/M2

## 2021-02-22 PROCEDURE — 99072 ADDL SUPL MATRL&STAF TM PHE: CPT

## 2021-02-22 PROCEDURE — 99212 OFFICE O/P EST SF 10 MIN: CPT

## 2021-02-22 NOTE — HISTORY OF PRESENT ILLNESS
[de-identified] : This is a 55 yr old male here for a continuing medical weight management in preparation for planned laparoscopic sleeve gastrectomy. \par His weight has been relatively stable and he is continuing to stick to a healthy, lower carbohydrate diet.  He is walking is much as he can.  Reports no changes to his medical history.\par \par

## 2021-02-22 NOTE — PHYSICAL EXAM
[Obese, well nourished, in no acute distress] : obese, well nourished, in no acute distress [Normal] : affect appropriate [de-identified] : Normal respirations [de-identified] : soft, nontender, nondistended

## 2021-03-26 ENCOUNTER — TRANSCRIPTION ENCOUNTER (OUTPATIENT)
Age: 56
End: 2021-03-26

## 2021-04-09 ENCOUNTER — TRANSCRIPTION ENCOUNTER (OUTPATIENT)
Age: 56
End: 2021-04-09

## 2021-04-27 ENCOUNTER — APPOINTMENT (OUTPATIENT)
Dept: ENDOCRINOLOGY | Facility: CLINIC | Age: 56
End: 2021-04-27
Payer: COMMERCIAL

## 2021-04-27 VITALS
HEART RATE: 68 BPM | OXYGEN SATURATION: 98 % | SYSTOLIC BLOOD PRESSURE: 129 MMHG | TEMPERATURE: 98.2 F | BODY MASS INDEX: 44.02 KG/M2 | WEIGHT: 314.44 LBS | HEIGHT: 71 IN | DIASTOLIC BLOOD PRESSURE: 82 MMHG

## 2021-04-27 LAB
GLUCOSE BLDC GLUCOMTR-MCNC: 201
HBA1C MFR BLD HPLC: 8.4

## 2021-04-27 PROCEDURE — 99214 OFFICE O/P EST MOD 30 MIN: CPT | Mod: 25

## 2021-04-27 PROCEDURE — 83036 HEMOGLOBIN GLYCOSYLATED A1C: CPT | Mod: QW

## 2021-04-27 PROCEDURE — 99072 ADDL SUPL MATRL&STAF TM PHE: CPT

## 2021-04-27 PROCEDURE — 36415 COLL VENOUS BLD VENIPUNCTURE: CPT

## 2021-04-27 PROCEDURE — 82962 GLUCOSE BLOOD TEST: CPT

## 2021-04-27 NOTE — HISTORY OF PRESENT ILLNESS
[FreeTextEntry1] : Mr. JUSTICE   is a 55 year old  male who  returns today in follow up with regard to a history of type 2 diabetes mellitus. The diabetes mellitus was dx about one half years ago.  There is no known history of retinopathy, nephropathy. He  too denies any history of neuropathy. Current dm medication include Metformin 500 mg 2 bid..HGM of late has shown values as loiw as 105 with exercise but comes back to 140 range where most numbers are running in amaround 160- 180 with some to 200. There has been no significant hypoglycemia.  He  denies any chest pain, sob, neurologic or ophthalmologic complaints. He  too denies any new podiatric concerns. He  is up to date with his ophthalmologic visit.  He is to try R diet.  New insurance not covering bariatric surgery.\par \par Coronary astent palced 3/4/2020.\par Additional medical history includes that of  obesity and hyperlipidemia.Does take otc vit d. Is on Humira per Dr. Buck for underlying psoriatic arthritis and psoriasis.\par Hx of  coronary stent  on plavix and and and metoprolol.  Too is on Atorvastatin 40 mg daily along with ASA 81 mg daily.\par He too continues on Humira for Psoriatic Arthritis.\par Opho neg recently.\par Vit d 44 taking daily otc.\par May consider gastric sleeve-has met with Dr. Vazquez.\par Sedentary -not exercising much.\par OPtho Dr. Marte-no retinopathy\par Does have a  bicycle for the outside \par Optho -no retinopathy.\par Lost wt on the Jarediance but ddi not feel well-was incredibly thirsty and didn’t feel well mentally on the medication.\par \par \par

## 2021-06-11 ENCOUNTER — APPOINTMENT (OUTPATIENT)
Dept: ENDOCRINOLOGY | Facility: CLINIC | Age: 56
End: 2021-06-11
Payer: COMMERCIAL

## 2021-06-11 VITALS
HEART RATE: 70 BPM | OXYGEN SATURATION: 97 % | DIASTOLIC BLOOD PRESSURE: 80 MMHG | BODY MASS INDEX: 44.1 KG/M2 | WEIGHT: 315 LBS | HEIGHT: 71 IN | SYSTOLIC BLOOD PRESSURE: 130 MMHG

## 2021-06-11 PROCEDURE — 95250 CONT GLUC MNTR PHYS/QHP EQP: CPT

## 2021-06-11 PROCEDURE — 99072 ADDL SUPL MATRL&STAF TM PHE: CPT

## 2021-06-11 PROCEDURE — 99214 OFFICE O/P EST MOD 30 MIN: CPT

## 2021-06-11 NOTE — HISTORY OF PRESENT ILLNESS
[FreeTextEntry1] : Mr. JUSTICE is a 55 year old  male who  returns today in follow up with regard to a history of type 2 diabetes mellitus. The diabetes mellitus was dx about 3-4 years, prior to that he was pre-diabetes.  There is no known history of retinopathy, nephropathy. He  too denies any history of neuropathy. Current dm medication include Metformin 500 mg 2 tabs bid. He was placed on Ozempic, took for 5 weeks, however ran out of refills. Pt had intermittent nausea on Ozempic. Off Syndardy. HGM of late has shown values running in the 160s pre-meals. There has been no significant hypoglycemia.  He  denies any chest pain, sob, neurologic or ophthalmologic complaints. He  too denies any new podiatric concerns. He  is up to date with his ophthalmologic visit, no retinopathy noted.  He is to try HMR diet.  New insurance not covering bariatric surgery.\par Recent A1c 8.5 % from 04/27/2021. \par POCT glucose returned today at 298 mg/dL, about 1 hour postprandial. \par Has cut out bread of late, still eating fruits. Does admit to having large portion meals. \par \par Had been using [employer] are family toon bike 4-5 times per week. \par Coronary stent placed 3/4/2020.\par Additional medical history includes that of  obesity and hyperlipidemia. Does take otc vit d. Is on Humira per Dr. Buck for underlying psoriatic arthritis and psoriasis.\par Hx of  coronary stent  on plavix and and and metoprolol.  Too is on Atorvastatin 40 mg daily along with ASA 81 mg daily.\par He too continues on Humira for Psoriatic Arthritis.\par Taking diclofenac for toe pain. Had stopped taking Atorvastatin, ASA, and clopidogrel intermittently while on Diclofenac. \par Opho neg recently.\par On otc vit d 3 1,000 iu daily, recent vitamin D 25-OH returned at 32.2. \par Sedentary -not exercising much.\par OPtho Dr. Marte-no retinopathy\par Does have a  bicycle for the outside \par Lost wt on the Jardiance but did not feel well-was incredibly thirsty and didn’t feel well mentally on the medication.\par

## 2021-07-14 LAB
25(OH)D3 SERPL-MCNC: 32.2 NG/ML
ALBUMIN SERPL ELPH-MCNC: 4.3 G/DL
ALP BLD-CCNC: 65 U/L
ALT SERPL-CCNC: 25 U/L
ANION GAP SERPL CALC-SCNC: 15 MMOL/L
AST SERPL-CCNC: 34 U/L
BILIRUB SERPL-MCNC: 0.6 MG/DL
BUN SERPL-MCNC: 10 MG/DL
CALCIUM SERPL-MCNC: 9.7 MG/DL
CHLORIDE SERPL-SCNC: 100 MMOL/L
CHOLEST SERPL-MCNC: 123 MG/DL
CO2 SERPL-SCNC: 24 MMOL/L
CREAT SERPL-MCNC: 0.71 MG/DL
CREAT SPEC-SCNC: 128 MG/DL
ESTIMATED AVERAGE GLUCOSE: 197 MG/DL
FRUCTOSAMINE SERPL-MCNC: 325 UMOL/L
GLUCOSE BLDC GLUCOMTR-MCNC: 298
GLUCOSE SERPL-MCNC: 176 MG/DL
GLYCOMARK.: 4.1 UG/ML
HBA1C MFR BLD HPLC: 8.5 %
HDLC SERPL-MCNC: 29 MG/DL
LDLC SERPL DIRECT ASSAY-MCNC: 64 MG/DL
MICROALBUMIN 24H UR DL<=1MG/L-MCNC: <1.2 MG/DL
MICROALBUMIN/CREAT 24H UR-RTO: NORMAL MG/G
POTASSIUM SERPL-SCNC: 4.2 MMOL/L
PROT SERPL-MCNC: 7.4 G/DL
SODIUM SERPL-SCNC: 140 MMOL/L
T3FREE SERPL-MCNC: 3.37 PG/ML
T4 FREE SERPL-MCNC: 1.1 NG/DL
TRIGL SERPL-MCNC: 223 MG/DL
TSH SERPL-ACNC: 2.94 UIU/ML

## 2021-07-19 ENCOUNTER — APPOINTMENT (OUTPATIENT)
Dept: ENDOCRINOLOGY | Facility: CLINIC | Age: 56
End: 2021-07-19
Payer: COMMERCIAL

## 2021-07-19 PROCEDURE — 99072 ADDL SUPL MATRL&STAF TM PHE: CPT

## 2021-07-19 PROCEDURE — 95251 CONT GLUC MNTR ANALYSIS I&R: CPT

## 2021-08-12 LAB
25(OH)D3 SERPL-MCNC: 35.7 NG/ML
ALBUMIN SERPL ELPH-MCNC: 4.6 G/DL
ALP BLD-CCNC: 75 U/L
ALT SERPL-CCNC: 35 U/L
ANION GAP SERPL CALC-SCNC: 14 MMOL/L
APPEARANCE: CLEAR
AST SERPL-CCNC: 40 U/L
BACTERIA: NEGATIVE
BASOPHILS # BLD AUTO: 0.09 K/UL
BASOPHILS NFR BLD AUTO: 1.1 %
BILIRUB SERPL-MCNC: 0.5 MG/DL
BILIRUBIN URINE: NEGATIVE
BLOOD URINE: NEGATIVE
BUN SERPL-MCNC: 15 MG/DL
CALCIUM SERPL-MCNC: 9.5 MG/DL
CHLORIDE SERPL-SCNC: 98 MMOL/L
CHOLEST SERPL-MCNC: 114 MG/DL
CK SERPL-CCNC: 377 U/L
CK SERPL-CCNC: 465 U/L
CO2 SERPL-SCNC: 26 MMOL/L
COLOR: YELLOW
CREAT SERPL-MCNC: 0.73 MG/DL
CREAT SPEC-SCNC: 113 MG/DL
EOSINOPHIL # BLD AUTO: 0.3 K/UL
EOSINOPHIL NFR BLD AUTO: 3.6 %
ESTIMATED AVERAGE GLUCOSE: 209 MG/DL
GLUCOSE QUALITATIVE U: ABNORMAL
GLUCOSE SERPL-MCNC: 290 MG/DL
HBA1C MFR BLD HPLC: 8.9 %
HCT VFR BLD CALC: 45.6 %
HDLC SERPL-MCNC: 25 MG/DL
HGB BLD-MCNC: 14.8 G/DL
HYALINE CASTS: 1 /LPF
IMM GRANULOCYTES NFR BLD AUTO: 0.2 %
KETONES URINE: NORMAL
LDLC SERPL CALC-MCNC: 46 MG/DL
LDLC SERPL DIRECT ASSAY-MCNC: 58 MG/DL
LEUKOCYTE ESTERASE URINE: NEGATIVE
LYMPHOCYTES # BLD AUTO: 2.87 K/UL
LYMPHOCYTES NFR BLD AUTO: 34.8 %
MAGNESIUM SERPL-MCNC: 1.6 MG/DL
MAN DIFF?: NORMAL
MCHC RBC-ENTMCNC: 28.7 PG
MCHC RBC-ENTMCNC: 32.5 GM/DL
MCV RBC AUTO: 88.5 FL
MICROALBUMIN 24H UR DL<=1MG/L-MCNC: <1.2 MG/DL
MICROALBUMIN/CREAT 24H UR-RTO: NORMAL MG/G
MICROSCOPIC-UA: NORMAL
MONOCYTES # BLD AUTO: 0.63 K/UL
MONOCYTES NFR BLD AUTO: 7.6 %
NEUTROPHILS # BLD AUTO: 4.34 K/UL
NEUTROPHILS NFR BLD AUTO: 52.7 %
NITRITE URINE: NEGATIVE
NONHDLC SERPL-MCNC: 89 MG/DL
PH URINE: 6
PHOSPHATE SERPL-MCNC: 3.6 MG/DL
PLATELET # BLD AUTO: 228 K/UL
POTASSIUM SERPL-SCNC: 4.2 MMOL/L
PROT SERPL-MCNC: 7.3 G/DL
PROTEIN URINE: NEGATIVE
PSA SERPL-MCNC: 0.38 NG/ML
RBC # BLD: 5.15 M/UL
RBC # FLD: 13.4 %
RED BLOOD CELLS URINE: 1 /HPF
SODIUM SERPL-SCNC: 138 MMOL/L
SPECIFIC GRAVITY URINE: 1.02
SQUAMOUS EPITHELIAL CELLS: 0 /HPF
T3RU NFR SERPL: 1.1 TBI
T4 FREE SERPL-MCNC: 1.1 NG/DL
T4 SERPL-MCNC: 7.5 UG/DL
TRIGL SERPL-MCNC: 214 MG/DL
TSH SERPL-ACNC: 2.65 UIU/ML
URATE SERPL-MCNC: 7 MG/DL
UROBILINOGEN URINE: NORMAL
WBC # FLD AUTO: 8.25 K/UL
WHITE BLOOD CELLS URINE: 1 /HPF

## 2021-11-20 ENCOUNTER — RX RENEWAL (OUTPATIENT)
Age: 56
End: 2021-11-20

## 2021-12-04 ENCOUNTER — RX RENEWAL (OUTPATIENT)
Age: 56
End: 2021-12-04

## 2021-12-10 ENCOUNTER — TRANSCRIPTION ENCOUNTER (OUTPATIENT)
Age: 56
End: 2021-12-10

## 2021-12-13 ENCOUNTER — RX RENEWAL (OUTPATIENT)
Age: 56
End: 2021-12-13

## 2021-12-23 ENCOUNTER — NON-APPOINTMENT (OUTPATIENT)
Age: 56
End: 2021-12-23

## 2021-12-27 ENCOUNTER — TRANSCRIPTION ENCOUNTER (OUTPATIENT)
Age: 56
End: 2021-12-27

## 2021-12-27 ENCOUNTER — NON-APPOINTMENT (OUTPATIENT)
Age: 56
End: 2021-12-27

## 2022-01-26 ENCOUNTER — APPOINTMENT (OUTPATIENT)
Dept: CARDIOLOGY | Facility: CLINIC | Age: 57
End: 2022-01-26
Payer: COMMERCIAL

## 2022-01-26 ENCOUNTER — NON-APPOINTMENT (OUTPATIENT)
Age: 57
End: 2022-01-26

## 2022-01-26 VITALS
WEIGHT: 310 LBS | OXYGEN SATURATION: 98 % | DIASTOLIC BLOOD PRESSURE: 70 MMHG | SYSTOLIC BLOOD PRESSURE: 121 MMHG | HEIGHT: 71 IN | HEART RATE: 61 BPM | BODY MASS INDEX: 43.4 KG/M2 | TEMPERATURE: 98.3 F

## 2022-01-26 DIAGNOSIS — Z71.85 ENCOUNTER FOR IMMUNIZATION SAFETY COUNSELING: ICD-10-CM

## 2022-01-26 DIAGNOSIS — Z00.00 ENCOUNTER FOR GENERAL ADULT MEDICAL EXAMINATION W/OUT ABNORMAL FINDINGS: ICD-10-CM

## 2022-01-26 DIAGNOSIS — Z12.11 ENCOUNTER FOR SCREENING FOR MALIGNANT NEOPLASM OF COLON: ICD-10-CM

## 2022-01-26 DIAGNOSIS — Z23 ENCOUNTER FOR IMMUNIZATION: ICD-10-CM

## 2022-01-26 PROCEDURE — 99396 PREV VISIT EST AGE 40-64: CPT

## 2022-01-26 PROCEDURE — 93000 ELECTROCARDIOGRAM COMPLETE: CPT

## 2022-01-26 NOTE — HISTORY OF PRESENT ILLNESS
[de-identified] : This is a 57 yo male with a Pmhx of HLD CAD s/p PCI DMII, obesity,who presents to the office for annual exam\par \par pt report feeling well recent  Dx of COVId mild Sx s/p monoclonal antibodies pt reports Sx are completed resolved denies any cough or SOc\par \par Denies any CP   heart palpitations fever or chills  pt with rare dyspnea

## 2022-01-26 NOTE — PHYSICAL EXAM
[No Acute Distress] : no acute distress [Well Nourished] : well nourished [Well Developed] : well developed [Well-Appearing] : well-appearing [Normal Sclera/Conjunctiva] : normal sclera/conjunctiva [PERRL] : pupils equal round and reactive to light [EOMI] : extraocular movements intact [Normal Outer Ear/Nose] : the outer ears and nose were normal in appearance [Normal Oropharynx] : the oropharynx was normal [No JVD] : no jugular venous distention [No Lymphadenopathy] : no lymphadenopathy [Supple] : supple [Thyroid Normal, No Nodules] : the thyroid was normal and there were no nodules present [No Respiratory Distress] : no respiratory distress  [No Accessory Muscle Use] : no accessory muscle use [Clear to Auscultation] : lungs were clear to auscultation bilaterally [Normal Rate] : normal rate  [Regular Rhythm] : with a regular rhythm [Normal S1, S2] : normal S1 and S2 [No Murmur] : no murmur heard [No Carotid Bruits] : no carotid bruits [No Abdominal Bruit] : a ~M bruit was not heard ~T in the abdomen [No Varicosities] : no varicosities [Pedal Pulses Present] : the pedal pulses are present [No Edema] : there was no peripheral edema [No Palpable Aorta] : no palpable aorta [No Extremity Clubbing/Cyanosis] : no extremity clubbing/cyanosis [Soft] : abdomen soft [Non Tender] : non-tender [Non-distended] : non-distended [No Masses] : no abdominal mass palpated [No HSM] : no HSM [Normal Bowel Sounds] : normal bowel sounds [Normal Posterior Cervical Nodes] : no posterior cervical lymphadenopathy [Normal Anterior Cervical Nodes] : no anterior cervical lymphadenopathy [No CVA Tenderness] : no CVA  tenderness [No Spinal Tenderness] : no spinal tenderness [No Joint Swelling] : no joint swelling [Grossly Normal Strength/Tone] : grossly normal strength/tone [No Rash] : no rash [Coordination Grossly Intact] : coordination grossly intact [No Focal Deficits] : no focal deficits [Normal Gait] : normal gait [Deep Tendon Reflexes (DTR)] : deep tendon reflexes were 2+ and symmetric [Normal Affect] : the affect was normal [Normal Insight/Judgement] : insight and judgment were intact [FreeTextEntry1] : normal prostate  [de-identified] : normal genitals

## 2022-02-16 ENCOUNTER — NON-APPOINTMENT (OUTPATIENT)
Age: 57
End: 2022-02-16

## 2022-02-17 ENCOUNTER — APPOINTMENT (OUTPATIENT)
Dept: CARDIOLOGY | Facility: CLINIC | Age: 57
End: 2022-02-17
Payer: COMMERCIAL

## 2022-02-17 PROCEDURE — 78452 HT MUSCLE IMAGE SPECT MULT: CPT

## 2022-02-17 PROCEDURE — A9500: CPT

## 2022-02-17 PROCEDURE — 93306 TTE W/DOPPLER COMPLETE: CPT

## 2022-02-17 PROCEDURE — 93015 CV STRESS TEST SUPVJ I&R: CPT

## 2022-02-18 ENCOUNTER — NON-APPOINTMENT (OUTPATIENT)
Age: 57
End: 2022-02-18

## 2022-02-18 RX ORDER — CLOPIDOGREL BISULFATE 75 MG/1
75 TABLET, FILM COATED ORAL
Qty: 30 | Refills: 0 | Status: DISCONTINUED | COMMUNITY
Start: 2020-03-13 | End: 2022-02-18

## 2022-02-22 ENCOUNTER — TRANSCRIPTION ENCOUNTER (OUTPATIENT)
Age: 57
End: 2022-02-22

## 2022-02-26 ENCOUNTER — RX RENEWAL (OUTPATIENT)
Age: 57
End: 2022-02-26

## 2022-03-09 ENCOUNTER — NON-APPOINTMENT (OUTPATIENT)
Age: 57
End: 2022-03-09

## 2022-03-09 LAB
25(OH)D3 SERPL-MCNC: 28.1 NG/ML
ALBUMIN SERPL ELPH-MCNC: 4.8 G/DL
ALP BLD-CCNC: 65 U/L
ALT SERPL-CCNC: 30 U/L
ANION GAP SERPL CALC-SCNC: 15 MMOL/L
APPEARANCE: CLEAR
AST SERPL-CCNC: 35 U/L
BACTERIA: NEGATIVE
BASOPHILS # BLD AUTO: 0.1 K/UL
BASOPHILS NFR BLD AUTO: 1.1 %
BILIRUB SERPL-MCNC: 0.4 MG/DL
BILIRUBIN URINE: NEGATIVE
BLOOD URINE: NEGATIVE
BUN SERPL-MCNC: 14 MG/DL
CALCIUM SERPL-MCNC: 10 MG/DL
CHLORIDE SERPL-SCNC: 97 MMOL/L
CHOLEST SERPL-MCNC: 151 MG/DL
CK SERPL-CCNC: 333 U/L
CO2 SERPL-SCNC: 26 MMOL/L
COLOR: YELLOW
CREAT SERPL-MCNC: 0.79 MG/DL
CREAT SPEC-SCNC: 123 MG/DL
CREAT/PROT UR: 0.1 RATIO
EOSINOPHIL # BLD AUTO: 0.35 K/UL
EOSINOPHIL NFR BLD AUTO: 3.8 %
ESTIMATED AVERAGE GLUCOSE: 169 MG/DL
GLUCOSE QUALITATIVE U: NEGATIVE
GLUCOSE SERPL-MCNC: 200 MG/DL
HBA1C MFR BLD HPLC: 7.5 %
HCT VFR BLD CALC: 46.2 %
HDLC SERPL-MCNC: 33 MG/DL
HGB BLD-MCNC: 15 G/DL
HYALINE CASTS: 0 /LPF
IMM GRANULOCYTES NFR BLD AUTO: 0.3 %
KETONES URINE: NEGATIVE
LDLC SERPL CALC-MCNC: 69 MG/DL
LEUKOCYTE ESTERASE URINE: NEGATIVE
LYMPHOCYTES # BLD AUTO: 3.53 K/UL
LYMPHOCYTES NFR BLD AUTO: 38.5 %
MAN DIFF?: NORMAL
MCHC RBC-ENTMCNC: 29.4 PG
MCHC RBC-ENTMCNC: 32.5 GM/DL
MCV RBC AUTO: 90.4 FL
MICROSCOPIC-UA: NORMAL
MONOCYTES # BLD AUTO: 0.66 K/UL
MONOCYTES NFR BLD AUTO: 7.2 %
NEUTROPHILS # BLD AUTO: 4.51 K/UL
NEUTROPHILS NFR BLD AUTO: 49.1 %
NITRITE URINE: NEGATIVE
NONHDLC SERPL-MCNC: 118 MG/DL
PH URINE: 6
PLATELET # BLD AUTO: 256 K/UL
POTASSIUM SERPL-SCNC: 4.3 MMOL/L
PROT SERPL-MCNC: 8.1 G/DL
PROT UR-MCNC: 7 MG/DL
PROTEIN URINE: NEGATIVE
PSA FREE SERPL-MCNC: 0.3 NG/ML
RBC # BLD: 5.11 M/UL
RBC # FLD: 13.7 %
RED BLOOD CELLS URINE: 1 /HPF
SODIUM SERPL-SCNC: 138 MMOL/L
SPECIFIC GRAVITY URINE: 1.02
SQUAMOUS EPITHELIAL CELLS: 0 /HPF
T4 SERPL-MCNC: 7.3 UG/DL
TRIGL SERPL-MCNC: 244 MG/DL
TSH SERPL-ACNC: 2.61 UIU/ML
UROBILINOGEN URINE: NORMAL
WBC # FLD AUTO: 9.18 K/UL
WHITE BLOOD CELLS URINE: 1 /HPF

## 2022-03-09 RX ORDER — MULTIVIT-MIN/IRON/FOLIC ACID/K 18-600-40
50 MCG CAPSULE ORAL
Qty: 90 | Refills: 1 | Status: ACTIVE | COMMUNITY
Start: 2022-03-09 | End: 1900-01-01

## 2022-03-29 ENCOUNTER — TRANSCRIPTION ENCOUNTER (OUTPATIENT)
Age: 57
End: 2022-03-29

## 2022-04-01 ENCOUNTER — NON-APPOINTMENT (OUTPATIENT)
Age: 57
End: 2022-04-01

## 2022-04-01 ENCOUNTER — APPOINTMENT (OUTPATIENT)
Dept: PULMONOLOGY | Facility: CLINIC | Age: 57
End: 2022-04-01
Payer: COMMERCIAL

## 2022-04-01 VITALS
DIASTOLIC BLOOD PRESSURE: 80 MMHG | HEART RATE: 64 BPM | TEMPERATURE: 98.1 F | OXYGEN SATURATION: 94 % | SYSTOLIC BLOOD PRESSURE: 137 MMHG

## 2022-04-01 PROCEDURE — 99203 OFFICE O/P NEW LOW 30 MIN: CPT

## 2022-04-02 NOTE — ASSESSMENT
[FreeTextEntry1] : Patient compliant to CPAP therapy and having positive clinical response to treatment. Continue present settings.\par \par Repeat home sleep study and obtain new equipment.

## 2022-04-02 NOTE — DISCUSSION/SUMMARY
[FreeTextEntry1] : Obstructive sleep apnea syndrome doing well on CPAP therapy.\par Patient eligible for new machine.  Discussed.\par

## 2022-04-02 NOTE — PHYSICAL EXAM
[No Acute Distress] : no acute distress [Normal Oropharynx] : normal oropharynx [Supple] : supple [No JVD] : no jvd [No Murmurs] : no murmurs [Normal S1, S2] : normal s1, s2 [Clear to Auscultation Bilaterally] : clear to auscultation bilaterally [Normal to Percussion] : normal to percussion [No Abnormalities] : no abnormalities [Benign] : benign [Not Tender] : not tender [No Clubbing] : no clubbing [No HSM] : no hsm [Oriented x3] : oriented x3 [TextBox_2] : Overweight

## 2022-04-02 NOTE — HISTORY OF PRESENT ILLNESS
[Never] : never [TextBox_4] : SELENE JUSTICE is a 56 year old  M referred for pulmonary evaluation for CONCEPCIÓN\par \par Has been on CPAP for 4-5 years. \par Has Alvarado and got new machine but not working so using old machine and waiting for second new.  Aware of Alvarado recall.\par No issues with CPAP\par \par Past pulmonary history. CONCEPCIÓN\par Occupational Exposure. N\par Family history of pulmonary disease. N\par \par \par Feeling well with CPAP. \par \par Weight relatively stable.\par

## 2022-04-14 ENCOUNTER — APPOINTMENT (OUTPATIENT)
Dept: PULMONOLOGY | Facility: CLINIC | Age: 57
End: 2022-04-14
Payer: COMMERCIAL

## 2022-04-14 PROCEDURE — 95800 SLP STDY UNATTENDED: CPT

## 2022-04-15 ENCOUNTER — APPOINTMENT (OUTPATIENT)
Dept: ENDOCRINOLOGY | Facility: CLINIC | Age: 57
End: 2022-04-15
Payer: COMMERCIAL

## 2022-04-15 VITALS
DIASTOLIC BLOOD PRESSURE: 72 MMHG | RESPIRATION RATE: 15 BRPM | SYSTOLIC BLOOD PRESSURE: 124 MMHG | HEART RATE: 76 BPM | TEMPERATURE: 98.6 F | OXYGEN SATURATION: 98 % | WEIGHT: 307 LBS | BODY MASS INDEX: 42.82 KG/M2

## 2022-04-15 LAB
GLUCOSE BLDC GLUCOMTR-MCNC: 202
HBA1C MFR BLD HPLC: 7.5

## 2022-04-15 PROCEDURE — 99214 OFFICE O/P EST MOD 30 MIN: CPT

## 2022-04-15 PROCEDURE — 95800 SLP STDY UNATTENDED: CPT

## 2022-04-15 PROCEDURE — 83036 HEMOGLOBIN GLYCOSYLATED A1C: CPT | Mod: QW

## 2022-04-15 PROCEDURE — 82962 GLUCOSE BLOOD TEST: CPT

## 2022-04-15 RX ORDER — EMPAGLIFLOZIN AND METFORMIN HYDROCHLORIDE 5; 1000 MG/1; MG/1
5-1000 TABLET ORAL
Qty: 180 | Refills: 1 | Status: DISCONTINUED | COMMUNITY
Start: 2020-12-18 | End: 2022-04-15

## 2022-04-18 LAB
25(OH)D3 SERPL-MCNC: 37.6 NG/ML
ALBUMIN SERPL ELPH-MCNC: 4.7 G/DL
ALP BLD-CCNC: 65 U/L
ALT SERPL-CCNC: 38 U/L
ANION GAP SERPL CALC-SCNC: 14 MMOL/L
AST SERPL-CCNC: 37 U/L
BILIRUB SERPL-MCNC: 0.5 MG/DL
BUN SERPL-MCNC: 10 MG/DL
CALCIUM SERPL-MCNC: 10.2 MG/DL
CHLORIDE SERPL-SCNC: 98 MMOL/L
CHOLEST SERPL-MCNC: 121 MG/DL
CO2 SERPL-SCNC: 26 MMOL/L
CREAT SERPL-MCNC: 0.68 MG/DL
CREAT SPEC-SCNC: 99 MG/DL
EGFR: 110 ML/MIN/1.73M2
ESTIMATED AVERAGE GLUCOSE: 192 MG/DL
FRUCTOSAMINE SERPL-MCNC: 340 UMOL/L
GLUCOSE SERPL-MCNC: 173 MG/DL
GLYCOMARK.: 3.4 UG/ML
HBA1C MFR BLD HPLC: 8.3 %
HDLC SERPL-MCNC: 31 MG/DL
LDLC SERPL DIRECT ASSAY-MCNC: 66 MG/DL
MICROALBUMIN 24H UR DL<=1MG/L-MCNC: <1.2 MG/DL
MICROALBUMIN/CREAT 24H UR-RTO: NORMAL MG/G
POTASSIUM SERPL-SCNC: 4.5 MMOL/L
PROT SERPL-MCNC: 8.1 G/DL
SODIUM SERPL-SCNC: 138 MMOL/L
T3FREE SERPL-MCNC: 3.13 PG/ML
T4 FREE SERPL-MCNC: 1.1 NG/DL
TRIGL SERPL-MCNC: 149 MG/DL
TSH SERPL-ACNC: 2.98 UIU/ML
VIT B12 SERPL-MCNC: 525 PG/ML

## 2022-04-20 NOTE — HISTORY OF PRESENT ILLNESS
[FreeTextEntry1] : Mr. JUSTICE  is a 56 year old  male who  returns today in follow up with regard to a history of type 2 diabetes mellitus. The diabetes mellitus was dx about two  and  half years ago.  There is no known history of retinopathy, nephropathy. He  too denies any history of neuropathy. Current dm medication include Metformin 500 mg 2 bid. Was on Ozempic last year.  .HGM of late has shown values 140-160s in the am. There has been no significant hypoglycemia.  He  denies any chest pain, sob, neurologic or ophthalmologic complaints. He  too denies any new podiatric concerns. He  is up to date with his ophthalmologic visit.  He is to try HMR diet.  New insurance not covering bariatric surgery.\par POCT A1c returned today at 7.5% ; previously 7.5% on 01/26/2022.\par POCT glucose returned today at 202 mg/dL \par Currently on keto diet.\par Coronary stent placed 3/4/2020.\par Additional medical history includes that of  obesity and hyperlipidemia. Does take otc vit d. Is on Humira per Dr. Buck for underlying psoriatic arthritis and psoriasis.\par Hx of  coronary stent  on plavix and and and metoprolol.  Too is on Atorvastatin 40 mg daily along with ASA 81 mg daily.\par He too continues on Humira for Psoriatic Arthritis.\par Opho neg recently.\par On otc vit d 3 2,000 iu.\par Sedentary -not exercising much.\par Optho Dr. Marte-no retinopathy\par Does have a  bicycle for the outside \par Lost wt on the Jardiance but did not feel well-was incredibly thirsty

## 2022-04-20 NOTE — PHYSICAL EXAM
[Alert] : alert [Well Nourished] : well nourished [No Acute Distress] : no acute distress [Well Developed] : well developed [Normal Sclera/Conjunctiva] : normal sclera/conjunctiva [EOMI] : extra ocular movement intact [No Proptosis] : no proptosis [Normal Oropharynx] : the oropharynx was normal [Thyroid Not Enlarged] : the thyroid was not enlarged [No Thyroid Nodules] : no palpable thyroid nodules [No Respiratory Distress] : no respiratory distress [No Accessory Muscle Use] : no accessory muscle use [Clear to Auscultation] : lungs were clear to auscultation bilaterally [Normal S1, S2] : normal S1 and S2 [Normal Rate] : heart rate was normal [Regular Rhythm] : with a regular rhythm [No Edema] : no peripheral edema [Pedal Pulses Normal] : the pedal pulses are present [Normal Bowel Sounds] : normal bowel sounds [Not Tender] : non-tender [Soft] : abdomen soft [Not Distended] : not distended [Normal Anterior Cervical Nodes] : no anterior cervical lymphadenopathy [Normal Posterior Cervical Nodes] : no posterior cervical lymphadenopathy [No Spinal Tenderness] : no spinal tenderness [Spine Straight] : spine straight [No Stigmata of Cushings Syndrome] : no stigmata of Cushings Syndrome [Normal Gait] : normal gait [Normal Strength/Tone] : muscle strength and tone were normal [No Rash] : no rash [Normal Reflexes] : deep tendon reflexes were 2+ and symmetric [No Tremors] : no tremors [Oriented x3] : oriented to person, place, and time [Acanthosis Nigricans] : no acanthosis nigricans

## 2022-05-11 ENCOUNTER — APPOINTMENT (OUTPATIENT)
Dept: PULMONOLOGY | Facility: CLINIC | Age: 57
End: 2022-05-11
Payer: COMMERCIAL

## 2022-05-11 PROCEDURE — 99213 OFFICE O/P EST LOW 20 MIN: CPT | Mod: 95

## 2022-05-16 NOTE — REASON FOR VISIT
[Home] : at home, [unfilled] , at the time of the visit. [Medical Office: ([facility])___] : at the medical office located in  [Follow-Up] : a follow-up visit [Sleep Apnea] : sleep apnea [Verbal consent obtained from patient] : the patient, [unfilled]

## 2022-05-16 NOTE — ASSESSMENT
[FreeTextEntry1] : Patient compliant to CPAP therapy and having positive clinical response to treatment. Continue present settings.\par \par will do a overnight oximetry on auto cpap and then decide on ordering a new auto cpap or sending to the lab\par for a BiPAP titration study\par \par will call pt to set up overnight oximetry next week

## 2022-05-16 NOTE — HISTORY OF PRESENT ILLNESS
[TextBox_4] : This visit was provided via telehealth using real-time 2-way audio visual technology. The patient, SELENE JUSTICE , was located at home, [address] [place] [address]  at the time of the visit.\par The provider, Paolo Ordaz, was located at [place], [place] at the time of the visit. \par \par  The patient, Mr. SELENE JUSTICE  and Physician Paolo Alexander participated in the telehealth encounter.\par \par Verbal consent obtained by  from patient\par \par had 2 night HHS\par has a recalled Respironics that he is using regularly \par believes it is more than 5 years old\par

## 2022-05-16 NOTE — PROCEDURE
[FreeTextEntry1] : CPAP data obtained and reviewed.Ahi 1.9\par \par reviewed 2 night HHs with very severe CONCEPCIÓN with 15 percent desaturations

## 2022-05-16 NOTE — ADDENDUM
[FreeTextEntry1] : overnight oximetry on cpap without significant desaturations. Will order new auto cpap 8- 18 cm Resmed\par with a nasal pillows \par \par r/t 2 months after receiving cpap for compliance

## 2022-06-07 ENCOUNTER — TRANSCRIPTION ENCOUNTER (OUTPATIENT)
Age: 57
End: 2022-06-07

## 2022-07-20 ENCOUNTER — RX RENEWAL (OUTPATIENT)
Age: 57
End: 2022-07-20

## 2022-09-01 ENCOUNTER — APPOINTMENT (OUTPATIENT)
Dept: CARDIOLOGY | Facility: CLINIC | Age: 57
End: 2022-09-01

## 2022-09-01 ENCOUNTER — NON-APPOINTMENT (OUTPATIENT)
Age: 57
End: 2022-09-01

## 2022-09-01 VITALS
OXYGEN SATURATION: 97 % | DIASTOLIC BLOOD PRESSURE: 70 MMHG | HEART RATE: 86 BPM | HEIGHT: 71 IN | WEIGHT: 310 LBS | BODY MASS INDEX: 43.4 KG/M2 | SYSTOLIC BLOOD PRESSURE: 130 MMHG | TEMPERATURE: 98.2 F

## 2022-09-01 DIAGNOSIS — R14.0 ABDOMINAL DISTENSION (GASEOUS): ICD-10-CM

## 2022-09-01 PROCEDURE — 99214 OFFICE O/P EST MOD 30 MIN: CPT

## 2022-09-01 NOTE — HISTORY OF PRESENT ILLNESS
[FreeTextEntry1] : This is a 57 year old male who presents today for follow up visit. He reports that he has been on Ozempic for the past 3-4 months. He has c/o abdominal distention/bloating. Last BM yesterday. He endorses intermittent nausea. He has no acute complaints for today. Denies chest pain, dyspnea, dizziness, palpations, changes in appetite/bowel habits, nausea, vomiting, abdominal pain.\par

## 2022-09-07 LAB
ALBUMIN SERPL ELPH-MCNC: 4.6 G/DL
ALP BLD-CCNC: 58 U/L
ALT SERPL-CCNC: 28 U/L
AMYLASE/CREAT SERPL: 83 U/L
ANION GAP SERPL CALC-SCNC: 16 MMOL/L
APPEARANCE: CLEAR
AST SERPL-CCNC: 47 U/L
BACTERIA: NEGATIVE
BASOPHILS # BLD AUTO: 0.09 K/UL
BASOPHILS NFR BLD AUTO: 1 %
BILIRUB DIRECT SERPL-MCNC: 0.1 MG/DL
BILIRUB INDIRECT SERPL-MCNC: 0.3 MG/DL
BILIRUB SERPL-MCNC: 0.4 MG/DL
BILIRUBIN URINE: NEGATIVE
BLOOD URINE: NEGATIVE
BUN SERPL-MCNC: 9 MG/DL
CALCIUM SERPL-MCNC: 9.6 MG/DL
CHLORIDE SERPL-SCNC: 100 MMOL/L
CHOLEST SERPL-MCNC: 114 MG/DL
CK SERPL-CCNC: 389 U/L
CO2 SERPL-SCNC: 25 MMOL/L
COLOR: YELLOW
CREAT SERPL-MCNC: 0.72 MG/DL
CREAT SPEC-SCNC: 141 MG/DL
EGFR: 107 ML/MIN/1.73M2
EOSINOPHIL # BLD AUTO: 0.38 K/UL
EOSINOPHIL NFR BLD AUTO: 4 %
ESTIMATED AVERAGE GLUCOSE: 174 MG/DL
GLUCOSE QUALITATIVE U: NEGATIVE
GLUCOSE SERPL-MCNC: 113 MG/DL
HBA1C MFR BLD HPLC: 7.7 %
HCT VFR BLD CALC: 42.9 %
HDLC SERPL-MCNC: 32 MG/DL
HGB BLD-MCNC: 13.9 G/DL
HYALINE CASTS: 1 /LPF
IMM GRANULOCYTES NFR BLD AUTO: 0.4 %
KETONES URINE: NEGATIVE
LDLC SERPL CALC-MCNC: 44 MG/DL
LEUKOCYTE ESTERASE URINE: NEGATIVE
LPL SERPL-CCNC: 44 U/L
LYMPHOCYTES # BLD AUTO: 3.85 K/UL
LYMPHOCYTES NFR BLD AUTO: 40.8 %
MAN DIFF?: NORMAL
MCHC RBC-ENTMCNC: 29.4 PG
MCHC RBC-ENTMCNC: 32.4 GM/DL
MCV RBC AUTO: 90.9 FL
MICROALBUMIN 24H UR DL<=1MG/L-MCNC: <1.2 MG/DL
MICROALBUMIN/CREAT 24H UR-RTO: NORMAL MG/G
MICROSCOPIC-UA: NORMAL
MONOCYTES # BLD AUTO: 0.73 K/UL
MONOCYTES NFR BLD AUTO: 7.7 %
NEUTROPHILS # BLD AUTO: 4.35 K/UL
NEUTROPHILS NFR BLD AUTO: 46.1 %
NITRITE URINE: NEGATIVE
NONHDLC SERPL-MCNC: 81 MG/DL
PH URINE: 6
PLATELET # BLD AUTO: 263 K/UL
POTASSIUM SERPL-SCNC: 4.4 MMOL/L
PROT SERPL-MCNC: 7.5 G/DL
PROTEIN URINE: NEGATIVE
RBC # BLD: 4.72 M/UL
RBC # FLD: 13.9 %
RED BLOOD CELLS URINE: 1 /HPF
SODIUM SERPL-SCNC: 141 MMOL/L
SPECIFIC GRAVITY URINE: 1.02
SQUAMOUS EPITHELIAL CELLS: 0 /HPF
T3FREE SERPL-MCNC: 2.8 PG/ML
T4 FREE SERPL-MCNC: 1 NG/DL
TRIGL SERPL-MCNC: 184 MG/DL
TSH SERPL-ACNC: 2.63 UIU/ML
UROBILINOGEN URINE: NORMAL
WBC # FLD AUTO: 9.44 K/UL
WHITE BLOOD CELLS URINE: 2 /HPF

## 2022-09-09 ENCOUNTER — NON-APPOINTMENT (OUTPATIENT)
Age: 57
End: 2022-09-09

## 2022-09-09 RX ORDER — SEMAGLUTIDE 1.34 MG/ML
2 INJECTION, SOLUTION SUBCUTANEOUS
Qty: 3 | Refills: 3 | Status: DISCONTINUED | COMMUNITY
Start: 2021-04-27 | End: 2022-09-09

## 2022-09-19 ENCOUNTER — RX RENEWAL (OUTPATIENT)
Age: 57
End: 2022-09-19

## 2022-09-19 ENCOUNTER — TRANSCRIPTION ENCOUNTER (OUTPATIENT)
Age: 57
End: 2022-09-19

## 2022-09-23 ENCOUNTER — TRANSCRIPTION ENCOUNTER (OUTPATIENT)
Age: 57
End: 2022-09-23

## 2022-11-14 ENCOUNTER — APPOINTMENT (OUTPATIENT)
Dept: SURGERY | Facility: CLINIC | Age: 57
End: 2022-11-14

## 2022-11-14 VITALS — WEIGHT: 310 LBS | HEIGHT: 71 IN | BODY MASS INDEX: 43.4 KG/M2

## 2022-11-14 PROCEDURE — 99213 OFFICE O/P EST LOW 20 MIN: CPT | Mod: 95

## 2022-11-14 NOTE — HISTORY OF PRESENT ILLNESS
[Home] : at home, [unfilled] , at the time of the visit. [Medical Office: ([facility])___] : at the medical office located in  [Verbal consent obtained from patient] : the patient, [unfilled] [de-identified] : Wayne Barker is a 57 year old male who presents for follow-up bariatric consultation. \par His last visit was February 2021, after which insurance changed.  He presents today to resume preoperative weigh-in's for planned sleeve gastrectomy as discussed previously.\par No changes in medical history reported.  Continuing to moderate portion sizes and make more healthful choices.  Increasing activity levels as much as possible. \par

## 2022-11-14 NOTE — PHYSICAL EXAM
[Obese, well nourished, in no acute distress] : obese, well nourished, in no acute distress [Normal] : affect appropriate [de-identified] : Normal respirations

## 2022-11-14 NOTE — ASSESSMENT
[FreeTextEntry1] : 57-year-old male with longstanding history of morbid obesity (BMI 43) and comorbidities of T2D, psoriatic arthritis on Humira, CAD s/p LAD stent presents for continuing medical weight management in preparation for planned laparoscopic sleeve gastrectomy.

## 2022-11-18 LAB
25(OH)D3 SERPL-MCNC: 35.8 NG/ML
ALBUMIN SERPL ELPH-MCNC: 4.6 G/DL
ALP BLD-CCNC: 66 U/L
ALT SERPL-CCNC: 27 U/L
ANION GAP SERPL CALC-SCNC: 15 MMOL/L
APTT BLD: 33.2 SEC
AST SERPL-CCNC: 30 U/L
BASOPHILS # BLD AUTO: 0.07 K/UL
BASOPHILS NFR BLD AUTO: 0.9 %
BILIRUB SERPL-MCNC: 0.5 MG/DL
BUN SERPL-MCNC: 12 MG/DL
CALCIUM SERPL-MCNC: 10.1 MG/DL
CHLORIDE SERPL-SCNC: 98 MMOL/L
CO2 SERPL-SCNC: 26 MMOL/L
CREAT SERPL-MCNC: 0.71 MG/DL
EGFR: 108 ML/MIN/1.73M2
EOSINOPHIL # BLD AUTO: 0.35 K/UL
EOSINOPHIL NFR BLD AUTO: 4.4 %
ESTIMATED AVERAGE GLUCOSE: 203 MG/DL
FOLATE SERPL-MCNC: 15.1 NG/ML
GLUCOSE SERPL-MCNC: 241 MG/DL
HBA1C MFR BLD HPLC: 8.7 %
HCT VFR BLD CALC: 43 %
HGB BLD-MCNC: 14.4 G/DL
IMM GRANULOCYTES NFR BLD AUTO: 0.1 %
INR PPP: 1.06 RATIO
LYMPHOCYTES # BLD AUTO: 3.05 K/UL
LYMPHOCYTES NFR BLD AUTO: 38.6 %
MAN DIFF?: NORMAL
MCHC RBC-ENTMCNC: 29.5 PG
MCHC RBC-ENTMCNC: 33.5 GM/DL
MCV RBC AUTO: 88.1 FL
MONOCYTES # BLD AUTO: 0.56 K/UL
MONOCYTES NFR BLD AUTO: 7.1 %
NEUTROPHILS # BLD AUTO: 3.87 K/UL
NEUTROPHILS NFR BLD AUTO: 48.9 %
PLATELET # BLD AUTO: 253 K/UL
POTASSIUM SERPL-SCNC: 4.6 MMOL/L
PROT SERPL-MCNC: 7.6 G/DL
PSA SERPL-MCNC: 0.45 NG/ML
PT BLD: 12.3 SEC
RBC # BLD: 4.88 M/UL
RBC # FLD: 13.4 %
SODIUM SERPL-SCNC: 139 MMOL/L
TSH SERPL-ACNC: 2.24 UIU/ML
VIT B12 SERPL-MCNC: 444 PG/ML
WBC # FLD AUTO: 7.91 K/UL

## 2022-11-18 NOTE — REASON FOR VISIT
[Home] : at home, [unfilled] , at the time of the visit. [Medical Office: ([facility])___] : at the medical office located in  [Patient] : the patient [Follow-Up Visit] : a follow-up visit for [Morbid Obesity (BMI>40)] : morbid obesity (bmi>40)

## 2022-11-21 ENCOUNTER — APPOINTMENT (OUTPATIENT)
Dept: SURGERY | Facility: CLINIC | Age: 57
End: 2022-11-21

## 2022-11-21 ENCOUNTER — TRANSCRIPTION ENCOUNTER (OUTPATIENT)
Age: 57
End: 2022-11-21

## 2022-11-21 VITALS — BODY MASS INDEX: 42.26 KG/M2 | WEIGHT: 303 LBS

## 2022-11-21 LAB — VIT B1 SERPL-MCNC: 175.4 NMOL/L

## 2022-11-21 PROCEDURE — 99213 OFFICE O/P EST LOW 20 MIN: CPT | Mod: 95

## 2022-11-28 ENCOUNTER — APPOINTMENT (OUTPATIENT)
Dept: SURGERY | Facility: CLINIC | Age: 57
End: 2022-11-28

## 2022-11-28 VITALS — WEIGHT: 305 LBS | BODY MASS INDEX: 42.54 KG/M2

## 2022-11-28 PROCEDURE — 99213 OFFICE O/P EST LOW 20 MIN: CPT | Mod: 95

## 2022-11-28 NOTE — PLAN
[FreeTextEntry1] : Encouraged to eat more whole foods, read food labels and reduce intake of processed foods. Advised to eat more slowly, chew more thoroughly, put fork down in between bites.  Discussed importance of incorporating these healthy eating habits in order to optimize post-operative outcome.  F/u 1 month\par

## 2022-11-28 NOTE — HISTORY OF PRESENT ILLNESS
[Home] : at home, [unfilled] , at the time of the visit. [Medical Office: ([facility])___] : at the medical office located in  [Verbal consent obtained from patient] : the patient, [unfilled] [de-identified] : The patient presents for weight check in preparation for planned sleeve gastrectomy.\par No changes in medical history reported.  Continuing to moderate portion sizes and make more healthful choices.  Increasing activity levels as much as possible. \par

## 2022-11-29 ENCOUNTER — TRANSCRIPTION ENCOUNTER (OUTPATIENT)
Age: 57
End: 2022-11-29

## 2022-11-29 RX ORDER — ASPIRIN 81 MG/1
81 TABLET, CHEWABLE ORAL
Qty: 90 | Refills: 1 | Status: ACTIVE | COMMUNITY
Start: 2020-03-04 | End: 1900-01-01

## 2022-12-02 ENCOUNTER — APPOINTMENT (OUTPATIENT)
Dept: SURGERY | Facility: CLINIC | Age: 57
End: 2022-12-02

## 2022-12-02 VITALS — WEIGHT: 305 LBS | BODY MASS INDEX: 42.54 KG/M2

## 2022-12-02 PROCEDURE — 99213 OFFICE O/P EST LOW 20 MIN: CPT | Mod: 95

## 2022-12-02 NOTE — HISTORY OF PRESENT ILLNESS
[de-identified] : Wayne Barker presents for weight check in preparation for planned sleeve gastrectomy.\par No changes in medical history reported.  Continuing to moderate portion sizes and make more healthful choices.  Increasing activity levels as much as possible.\par \par Nutritionist 12/6\par Psych 12/16\par Endoscopy 12/7\par Rads studies 12/13\par Labs done \par Pulm and cards pending

## 2022-12-02 NOTE — PLAN
[FreeTextEntry1] : Encouraged to choose foods that decrease cravings and increase fullness. Increase lean protein intake, including plant proteins. The importance of hydration was emphasized.  The importance of eating slowly was emphasized. The patient is committed to learning and incorporating diet and exercise modifications to optimize success after bariatric surgery.  \par

## 2022-12-09 ENCOUNTER — APPOINTMENT (OUTPATIENT)
Dept: SURGERY | Facility: CLINIC | Age: 57
End: 2022-12-09

## 2022-12-09 VITALS — WEIGHT: 305 LBS | BODY MASS INDEX: 42.54 KG/M2

## 2022-12-09 PROCEDURE — 99213 OFFICE O/P EST LOW 20 MIN: CPT | Mod: 95

## 2022-12-09 NOTE — HISTORY OF PRESENT ILLNESS
[Home] : at home, [unfilled] , at the time of the visit. [Medical Office: ([facility])___] : at the medical office located in  [Verbal consent obtained from patient] : the patient, [unfilled] [de-identified] : Wayne Barker is a 57 year old male who presents for weight check in preparation for planned sleeve gastrectomy.\par No changes in medical history reported.  Continuing to moderate portion sizes and make more healthful choices.  Increasing activity levels as much as possible.\par Saw nutritionist and will follow up later this month.\par Saw GI (Brent) and will have endoscopy scheduled.\par Pulm and cards pending.\par

## 2022-12-09 NOTE — REASON FOR VISIT
[Home] : at home, [unfilled] , at the time of the visit. [Medical Office: ([facility])___] : at the medical office located in  [Patient] : the patient [Self] : self [Follow-Up Visit] : a follow-up visit for [Morbid Obesity (BMI>40)] : morbid obesity (bmi>40)

## 2022-12-09 NOTE — PLAN
[FreeTextEntry1] : Encouraged meal planning. Emphasized proper hydration while avoiding juice and soda.    The patient is committed to learning and incorporating diet and exercise modifications to optimize success after bariatric surgery. \par \par Discussed adding low-sugar protein shakes as a meal replacement once per day in preparation for bariatric diet.\par Encouraged to increase cardiovascular exercise as tolerated, and to add resistance training.\par \par Will f/u next week.\par \par \par

## 2022-12-12 ENCOUNTER — APPOINTMENT (OUTPATIENT)
Dept: SURGERY | Facility: CLINIC | Age: 57
End: 2022-12-12

## 2022-12-12 VITALS — BODY MASS INDEX: 42.26 KG/M2 | WEIGHT: 303 LBS

## 2022-12-12 PROCEDURE — 99213 OFFICE O/P EST LOW 20 MIN: CPT | Mod: 95

## 2022-12-12 NOTE — HISTORY OF PRESENT ILLNESS
[de-identified] : Wayne Barker is a 57 year old male who presents for weight check in preparation for planned sleeve gastrectomy.\par No changes in medical history reported.  Continuing to moderate portion sizes and make more healthful choices.  Increasing activity levels as much as possible. \par Down a couple of pounds.

## 2022-12-12 NOTE — PLAN
[FreeTextEntry1] : Reviewed postoperative dietary instructions\par Reviewed postoperative recovery expectations and plan\par Encouraged to continue whole food diet, minimizing processed foods and sugars\par Encouraged to continue regular exercise as tolerated\par

## 2022-12-13 ENCOUNTER — OUTPATIENT (OUTPATIENT)
Dept: OUTPATIENT SERVICES | Facility: HOSPITAL | Age: 57
LOS: 1 days | End: 2022-12-13
Payer: COMMERCIAL

## 2022-12-13 ENCOUNTER — APPOINTMENT (OUTPATIENT)
Dept: RADIOLOGY | Facility: HOSPITAL | Age: 57
End: 2022-12-13

## 2022-12-13 ENCOUNTER — APPOINTMENT (OUTPATIENT)
Dept: ULTRASOUND IMAGING | Facility: HOSPITAL | Age: 57
End: 2022-12-13

## 2022-12-13 DIAGNOSIS — Q68.6 DISCOID MENISCUS: Chronic | ICD-10-CM

## 2022-12-13 DIAGNOSIS — L40.9 PSORIASIS, UNSPECIFIED: ICD-10-CM

## 2022-12-13 DIAGNOSIS — Z98.890 OTHER SPECIFIED POSTPROCEDURAL STATES: Chronic | ICD-10-CM

## 2022-12-13 DIAGNOSIS — R03.0 ELEVATED BLOOD-PRESSURE READING, WITHOUT DIAGNOSIS OF HYPERTENSION: ICD-10-CM

## 2022-12-13 PROCEDURE — 74240 X-RAY XM UPR GI TRC 1CNTRST: CPT

## 2022-12-13 PROCEDURE — 76700 US EXAM ABDOM COMPLETE: CPT | Mod: 26

## 2022-12-13 PROCEDURE — 74240 X-RAY XM UPR GI TRC 1CNTRST: CPT | Mod: 26

## 2022-12-13 PROCEDURE — 76700 US EXAM ABDOM COMPLETE: CPT

## 2022-12-19 ENCOUNTER — APPOINTMENT (OUTPATIENT)
Dept: SURGERY | Facility: CLINIC | Age: 57
End: 2022-12-19

## 2022-12-19 VITALS — WEIGHT: 302 LBS | BODY MASS INDEX: 42.12 KG/M2

## 2022-12-19 PROCEDURE — 99213 OFFICE O/P EST LOW 20 MIN: CPT | Mod: 95

## 2022-12-19 NOTE — PLAN
[FreeTextEntry1] : Reviewed preoperative and postoperative dietary instructions. Reviewed importance of adhering to bariatric dietary instructions during the post-operative period. Reviewed importance of maintaining physical activity level before and after surgery to avoid complications.  All questions answered.\par

## 2022-12-19 NOTE — HISTORY OF PRESENT ILLNESS
[Home] : at home, [unfilled] , at the time of the visit. [Medical Office: ([facility])___] : at the medical office located in  [Verbal consent obtained from patient] : the patient, [unfilled] [de-identified] : Wayne Barker is a 57 year old male who presents for weight check in preparation for planned sleeve gastrectomy. \par No changes in medical history reported.  Continuing to moderate portion sizes and make more healthful choices.  Increasing activity levels as much as possible.\par EGD scheduled for friday.\par Psych and nutrition scheduled for this week.\par

## 2022-12-23 ENCOUNTER — APPOINTMENT (OUTPATIENT)
Dept: SURGERY | Facility: CLINIC | Age: 57
End: 2022-12-23

## 2022-12-23 VITALS — BODY MASS INDEX: 42.12 KG/M2 | WEIGHT: 302 LBS

## 2022-12-23 PROCEDURE — 99213 OFFICE O/P EST LOW 20 MIN: CPT | Mod: 95

## 2022-12-23 NOTE — HISTORY OF PRESENT ILLNESS
[Home] : at home, [unfilled] , at the time of the visit. [Medical Office: ([facility])___] : at the medical office located in  [Verbal consent obtained from patient] : the patient, [unfilled] [de-identified] : Wayne Barker is a 57 year old male who presents for weight check in preparation for planned sleeve gastrectomy. \par No changes in medical history reported.  Continuing to moderate portion sizes and make more healthful choices.  Increasing activity levels as much as possible.\par Has endoscopy later today.  \par Met with psychologist.\par

## 2023-01-06 ENCOUNTER — APPOINTMENT (OUTPATIENT)
Dept: SURGERY | Facility: CLINIC | Age: 58
End: 2023-01-06
Payer: COMMERCIAL

## 2023-01-06 VITALS — BODY MASS INDEX: 41.14 KG/M2 | WEIGHT: 295 LBS

## 2023-01-06 DIAGNOSIS — K44.9 DIAPHRAGMATIC HERNIA W/OUT OBSTRUCTION OR GANGRENE: ICD-10-CM

## 2023-01-06 PROCEDURE — 99214 OFFICE O/P EST MOD 30 MIN: CPT | Mod: 95

## 2023-01-06 NOTE — PLAN
[FreeTextEntry1] : Reviewed preoperative and postoperative dietary instructions. Reviewed importance of adhering to bariatric dietary instructions during the post-operative period. Reviewed importance of maintaining physical activity level before and after surgery to avoid complications.  \par Reviewed risks and benefits to hiatal hernia repair to be performed concurrently with sleeve gastrectomy.\par All questions answered.\par

## 2023-01-06 NOTE — ASSESSMENT
[FreeTextEntry1] : 57-year-old male with longstanding history of morbid obesity (BMI 43) and comorbidities of T2D, psoriatic arthritis on Humira, CAD s/p LAD stent presents for continuing medical weight management in preparation for planned laparoscopic sleeve gastrectomy.  \par Small hiatal hernia was noted on upper GI series, and this will be repaired at the time of surgery.

## 2023-01-06 NOTE — HISTORY OF PRESENT ILLNESS
[Home] : at home, [unfilled] , at the time of the visit. [Medical Office: ([facility])___] : at the medical office located in  [Verbal consent obtained from patient] : the patient, [unfilled] [de-identified] : Wayne Barker is a 57 year old male who presents for weight check in preparation for planned sleeve gastrectomy. \par No changes in medical history reported.  Continuing to moderate portion sizes and make more healthful choices.  Increasing activity levels as much as possible.\par \par Reviewed results of UGIS and abd U/S.\par Discussed presence of small hiatal hernia, its implications for postop reflux, and surgical treatment.\par Awaiting report from EGD.

## 2023-01-13 ENCOUNTER — APPOINTMENT (OUTPATIENT)
Dept: SURGERY | Facility: CLINIC | Age: 58
End: 2023-01-13
Payer: COMMERCIAL

## 2023-01-13 VITALS — WEIGHT: 295 LBS | BODY MASS INDEX: 41.14 KG/M2

## 2023-01-13 PROCEDURE — 99213 OFFICE O/P EST LOW 20 MIN: CPT | Mod: 95

## 2023-01-13 NOTE — ASSESSMENT
[FreeTextEntry1] : 57-year-old male with longstanding history of morbid obesity (BMI 43) and comorbidities of T2D, psoriatic arthritis on Humira, CAD s/p LAD stent presents for continuing medical weight management in preparation for planned laparoscopic sleeve gastrectomy.  \par Small hiatal hernia was noted on upper GI series, and this will be repaired at the time of surgery.\par EGD normal.

## 2023-01-13 NOTE — PLAN
[FreeTextEntry1] : Portion control will help reduce reflux symptoms.  \par Low-sugar protein shakes as a meal replacement once per day in preparation for bariatric diet.\par \par The patient expressed good understanding and is committed to learning and incorporating diet and exercise modifications to optimize success after bariatric surgery. \par \par Patient to see his rheumatologist to discuss holding Humira post-operatively

## 2023-01-13 NOTE — HISTORY OF PRESENT ILLNESS
[Home] : at home, [unfilled] , at the time of the visit. [Medical Office: ([facility])___] : at the medical office located in  [Verbal consent obtained from patient] : the patient, [unfilled] [de-identified] : SELENE is a 57 year old male presenting for a monthly weight check prior to bariatric surgery. \par No changes in medical history reported.  Continuing to moderate portion sizes and make more healthful choices.  Increasing activity levels as much as possible. \par \par Endoscopy reviewed - normal\par \par

## 2023-01-20 ENCOUNTER — APPOINTMENT (OUTPATIENT)
Dept: SURGERY | Facility: CLINIC | Age: 58
End: 2023-01-20
Payer: COMMERCIAL

## 2023-01-20 VITALS — BODY MASS INDEX: 41.14 KG/M2 | WEIGHT: 295 LBS

## 2023-01-20 PROCEDURE — 99213 OFFICE O/P EST LOW 20 MIN: CPT | Mod: 95

## 2023-01-20 NOTE — HISTORY OF PRESENT ILLNESS
[de-identified] : SELENE is a 57 year old male presenting for a monthly weight check prior to bariatric surgery. \par No changes in medical history reported. Continuing to moderate portion sizes and make more healthful choices. Increasing activity levels as much as possible. \par Completed preop workup.\par

## 2023-01-23 ENCOUNTER — APPOINTMENT (OUTPATIENT)
Dept: SURGERY | Facility: CLINIC | Age: 58
End: 2023-01-23
Payer: COMMERCIAL

## 2023-01-23 PROCEDURE — 99213 OFFICE O/P EST LOW 20 MIN: CPT | Mod: 95

## 2023-01-23 NOTE — HISTORY OF PRESENT ILLNESS
[Home] : at home, [unfilled] , at the time of the visit. [Medical Office: ([facility])___] : at the medical office located in  [Verbal consent obtained from patient] : the patient, [unfilled] [de-identified] : Wayne is a 57 year old male here for continuing medical weight management in preparation for planned laparoscopic sleeve gastrectomy. \par No changes in medical history reported.  Continuing to moderate portion sizes and make more healthful choices.  Increasing activity levels as much as possible. \par Final weigh-in visit.\par

## 2023-01-31 ENCOUNTER — APPOINTMENT (OUTPATIENT)
Dept: INTERNAL MEDICINE | Facility: CLINIC | Age: 58
End: 2023-01-31
Payer: COMMERCIAL

## 2023-01-31 ENCOUNTER — NON-APPOINTMENT (OUTPATIENT)
Age: 58
End: 2023-01-31

## 2023-01-31 VITALS
BODY MASS INDEX: 42.42 KG/M2 | HEART RATE: 61 BPM | OXYGEN SATURATION: 95 % | WEIGHT: 303 LBS | SYSTOLIC BLOOD PRESSURE: 132 MMHG | DIASTOLIC BLOOD PRESSURE: 70 MMHG | HEIGHT: 71 IN

## 2023-01-31 DIAGNOSIS — Z01.818 ENCOUNTER FOR OTHER PREPROCEDURAL EXAMINATION: ICD-10-CM

## 2023-01-31 LAB
ALBUMIN SERPL ELPH-MCNC: 4.4 G/DL
ALP BLD-CCNC: 72 U/L
ALT SERPL-CCNC: 28 U/L
ANION GAP SERPL CALC-SCNC: 13 MMOL/L
APTT BLD: 33.7 SEC
AST SERPL-CCNC: 32 U/L
BASOPHILS # BLD AUTO: 0.09 K/UL
BASOPHILS NFR BLD AUTO: 1.2 %
BILIRUB SERPL-MCNC: 0.5 MG/DL
BUN SERPL-MCNC: 11 MG/DL
CALCIUM SERPL-MCNC: 9.9 MG/DL
CHLORIDE SERPL-SCNC: 98 MMOL/L
CO2 SERPL-SCNC: 25 MMOL/L
CREAT SERPL-MCNC: 0.76 MG/DL
EGFR: 105 ML/MIN/1.73M2
EOSINOPHIL # BLD AUTO: 0.3 K/UL
EOSINOPHIL NFR BLD AUTO: 3.9 %
ESTIMATED AVERAGE GLUCOSE: 206 MG/DL
GLUCOSE SERPL-MCNC: 263 MG/DL
HBA1C MFR BLD HPLC: 8.8 %
HCT VFR BLD CALC: 42.1 %
HGB BLD-MCNC: 13.8 G/DL
IMM GRANULOCYTES NFR BLD AUTO: 0.3 %
INR PPP: 0.99 RATIO
LYMPHOCYTES # BLD AUTO: 3.02 K/UL
LYMPHOCYTES NFR BLD AUTO: 39.4 %
MAN DIFF?: NORMAL
MCHC RBC-ENTMCNC: 29.2 PG
MCHC RBC-ENTMCNC: 32.8 GM/DL
MCV RBC AUTO: 89 FL
MONOCYTES # BLD AUTO: 0.69 K/UL
MONOCYTES NFR BLD AUTO: 9 %
NEUTROPHILS # BLD AUTO: 3.55 K/UL
NEUTROPHILS NFR BLD AUTO: 46.2 %
PLATELET # BLD AUTO: 237 K/UL
POTASSIUM SERPL-SCNC: 4.4 MMOL/L
PROT SERPL-MCNC: 7.9 G/DL
PT BLD: 11.5 SEC
RBC # BLD: 4.73 M/UL
RBC # FLD: 14.2 %
SODIUM SERPL-SCNC: 136 MMOL/L
WBC # FLD AUTO: 7.67 K/UL

## 2023-01-31 PROCEDURE — 93000 ELECTROCARDIOGRAM COMPLETE: CPT

## 2023-01-31 PROCEDURE — 99214 OFFICE O/P EST MOD 30 MIN: CPT | Mod: 25

## 2023-01-31 RX ORDER — CHROMIUM 200 MCG
TABLET ORAL
Refills: 0 | Status: DISCONTINUED | COMMUNITY
End: 2023-01-31

## 2023-01-31 RX ORDER — SODIUM SULFATE, POTASSIUM SULFATE, MAGNESIUM SULFATE 17.5; 3.13; 1.6 G/ML; G/ML; G/ML
17.5-3.13-1.6 SOLUTION, CONCENTRATE ORAL
Qty: 1 | Refills: 0 | Status: DISCONTINUED | COMMUNITY
Start: 2019-11-19 | End: 2023-01-31

## 2023-01-31 NOTE — PHYSICAL EXAM
[No Acute Distress] : no acute distress [Well Nourished] : well nourished [Well Developed] : well developed [Well-Appearing] : well-appearing [Normal Sclera/Conjunctiva] : normal sclera/conjunctiva [Normal Outer Ear/Nose] : the outer ears and nose were normal in appearance [Normal Oropharynx] : the oropharynx was normal [No JVD] : no jugular venous distention [No Lymphadenopathy] : no lymphadenopathy [Supple] : supple [No Respiratory Distress] : no respiratory distress  [No Accessory Muscle Use] : no accessory muscle use [Clear to Auscultation] : lungs were clear to auscultation bilaterally [Normal Rate] : normal rate  [Regular Rhythm] : with a regular rhythm [Normal S1, S2] : normal S1 and S2 [No Murmur] : no murmur heard [No Carotid Bruits] : no carotid bruits [No Varicosities] : no varicosities [Pedal Pulses Present] : the pedal pulses are present [No Edema] : there was no peripheral edema [No Extremity Clubbing/Cyanosis] : no extremity clubbing/cyanosis [Soft] : abdomen soft [Non Tender] : non-tender [Non-distended] : non-distended [Normal Bowel Sounds] : normal bowel sounds [No CVA Tenderness] : no CVA  tenderness [No Spinal Tenderness] : no spinal tenderness [No Joint Swelling] : no joint swelling [Grossly Normal Strength/Tone] : grossly normal strength/tone [No Rash] : no rash [Coordination Grossly Intact] : coordination grossly intact [No Focal Deficits] : no focal deficits [Normal Gait] : normal gait [Normal Affect] : the affect was normal [Alert and Oriented x3] : oriented to person, place, and time

## 2023-01-31 NOTE — HISTORY OF PRESENT ILLNESS
[Coronary Artery Disease] : coronary artery disease [No Pertinent Pulmonary History] : no history of asthma, COPD, sleep apnea, or smoking [Sleep Apnea] : sleep apnea [No Adverse Anesthesia Reaction] : no adverse anesthesia reaction in self or family member [Chronic Anticoagulation] : chronic anticoagulation [(Patient denies any chest pain, claudication, dyspnea on exertion, orthopnea, palpitations or syncope)] : Patient denies any chest pain, claudication, dyspnea on exertion, orthopnea, palpitations or syncope [Moderate (4-6 METs)] : Moderate (4-6 METs) [Diabetes] : diabetes [Anti-Platelet Agents: _____] : Anti-Platelet Agents: [unfilled] [Aortic Stenosis] : no aortic stenosis [Atrial Fibrillation] : no atrial fibrillation [Recent Myocardial Infarction] : no recent myocardial infarction [Implantable Device/Pacemaker] : no implantable device/pacemaker [Asthma] : no asthma [COPD] : no COPD [Smoker] : not a smoker [Family Member] : no family member with adverse anesthesia reaction/sudden death [Self] : no previous adverse anesthesia reaction [Chronic Kidney Disease] : no chronic kidney disease [FreeTextEntry1] : laparoscopic sleeve gastrectomy [FreeTextEntry2] : 2/28/2022 [FreeTextEntry3] : Dr. Kody Vazquez [FreeTextEntry4] : SELENE JUSTICE is a 57 year old male with PMH of psoriatic arthritis on Humira CAD s/p stent 2020, HTN, HLD, type 2 DM, SVT, morbid obesity, CONCEPCIÓN presented to the office today for pre op medical evaluation\par Patient feels well. No complaints. Denies chest pain, sob, avery, dizziness, diaphoresis, palpitations, LE swelling, orthopnea, syncope, n/v, headache.\par  [FreeTextEntry7] : 2/2022: TTE: normal EF, grade 2 diast dysfunction, moderate LVH, mild Mr, mildly dilated ascending aorta 3.8cm\par 2/2022: NST: no ischemia

## 2023-02-03 ENCOUNTER — APPOINTMENT (OUTPATIENT)
Dept: PULMONOLOGY | Facility: CLINIC | Age: 58
End: 2023-02-03
Payer: COMMERCIAL

## 2023-02-03 ENCOUNTER — APPOINTMENT (OUTPATIENT)
Dept: PULMONOLOGY | Facility: CLINIC | Age: 58
End: 2023-02-03

## 2023-02-03 VITALS — SYSTOLIC BLOOD PRESSURE: 149 MMHG | HEART RATE: 76 BPM | DIASTOLIC BLOOD PRESSURE: 76 MMHG | OXYGEN SATURATION: 95 %

## 2023-02-03 DIAGNOSIS — Z01.818 ENCOUNTER FOR OTHER PREPROCEDURAL EXAMINATION: ICD-10-CM

## 2023-02-03 DIAGNOSIS — J98.4 OTHER DISORDERS OF LUNG: ICD-10-CM

## 2023-02-03 PROCEDURE — 94729 DIFFUSING CAPACITY: CPT

## 2023-02-03 PROCEDURE — 94010 BREATHING CAPACITY TEST: CPT

## 2023-02-03 PROCEDURE — 94727 GAS DIL/WSHOT DETER LNG VOL: CPT

## 2023-02-03 PROCEDURE — 99214 OFFICE O/P EST MOD 30 MIN: CPT | Mod: 25

## 2023-02-03 NOTE — PHYSICAL EXAM
[No Acute Distress] : no acute distress [Normal Appearance] : normal appearance [Normal Rate/Rhythm] : normal rate/rhythm [No Resp Distress] : no resp distress [Oriented x3] : oriented x3 [Normal Affect] : normal affect

## 2023-02-04 PROBLEM — J98.4 RESTRICTIVE LUNG DISEASE: Status: ACTIVE | Noted: 2023-02-04

## 2023-02-04 NOTE — DISCUSSION/SUMMARY
[FreeTextEntry1] : Severe Obstructive sleep apnea syndrome doing well on CPAP therapy.\par Preop for Gastric Sleeve.\par Mild restrictive physiology related to body habitus.\par \par

## 2023-02-04 NOTE — ASSESSMENT
[FreeTextEntry1] : Patient compliant to CPAP therapy and having positive clinical response to treatment. Continue present settings.\par \par Pulmonary status maximized. Pulmonary function adequate to tolerate proposed surgical procedure. \par No pulmonary contraindications to surgery\par Continue present bronchodilator therapy.\par \par Pt with CONCEPCIÓN. Anesthesia should be aware of CONCEPCIÓN and take CONCEPCIÓN precautions.\par \par \par 35 minutes spent in evaluation management and review of studies.\par \par \par \par

## 2023-02-04 NOTE — HISTORY OF PRESENT ILLNESS
[TextBox_4] : Needs pulmonary clearance for gastric sleeve Feb 28th 2023 Kody Martinez\par \par has been using cpap has new machine for past 3 months, using nasal  pillows\par did have a recall machine\par had overnight O2 on cpap with no desaturations\par \par No significant cough, wheezing, chest pain or SOB.\par Feeling well.\par \par

## 2023-02-04 NOTE — PROCEDURE
[FreeTextEntry1] : cpap data downloaded and discussed with patient. Good\par \par 02/03/2023\par Pulmonary function testing\par There is a mild ventilatory impairment in a restrictive pattern. There is elevation in the RV/TLC ratio indicative of possible air trapping. Diffusion capacity is normal. \par There is a mild decrement in function compared to November 2019.\par \par

## 2023-02-13 ENCOUNTER — APPOINTMENT (OUTPATIENT)
Dept: SURGERY | Facility: CLINIC | Age: 58
End: 2023-02-13
Payer: COMMERCIAL

## 2023-02-13 PROCEDURE — 99214 OFFICE O/P EST MOD 30 MIN: CPT

## 2023-02-16 ENCOUNTER — OUTPATIENT (OUTPATIENT)
Dept: OUTPATIENT SERVICES | Facility: HOSPITAL | Age: 58
LOS: 1 days | End: 2023-02-16
Payer: COMMERCIAL

## 2023-02-16 VITALS
OXYGEN SATURATION: 96 % | TEMPERATURE: 98 F | WEIGHT: 292.99 LBS | HEIGHT: 71 IN | HEART RATE: 66 BPM | SYSTOLIC BLOOD PRESSURE: 13 MMHG | RESPIRATION RATE: 18 BRPM

## 2023-02-16 DIAGNOSIS — E66.01 MORBID (SEVERE) OBESITY DUE TO EXCESS CALORIES: ICD-10-CM

## 2023-02-16 DIAGNOSIS — E11.9 TYPE 2 DIABETES MELLITUS WITHOUT COMPLICATIONS: ICD-10-CM

## 2023-02-16 DIAGNOSIS — G47.33 OBSTRUCTIVE SLEEP APNEA (ADULT) (PEDIATRIC): ICD-10-CM

## 2023-02-16 DIAGNOSIS — K44.9 DIAPHRAGMATIC HERNIA WITHOUT OBSTRUCTION OR GANGRENE: ICD-10-CM

## 2023-02-16 DIAGNOSIS — Q68.6 DISCOID MENISCUS: Chronic | ICD-10-CM

## 2023-02-16 DIAGNOSIS — Z01.818 ENCOUNTER FOR OTHER PREPROCEDURAL EXAMINATION: ICD-10-CM

## 2023-02-16 DIAGNOSIS — Z98.890 OTHER SPECIFIED POSTPROCEDURAL STATES: Chronic | ICD-10-CM

## 2023-02-16 LAB
ALBUMIN SERPL ELPH-MCNC: 4.8 G/DL — SIGNIFICANT CHANGE UP (ref 3.3–5)
ALP SERPL-CCNC: 62 U/L — SIGNIFICANT CHANGE UP (ref 40–120)
ALT FLD-CCNC: 30 U/L — SIGNIFICANT CHANGE UP (ref 10–45)
ANION GAP SERPL CALC-SCNC: 16 MMOL/L — SIGNIFICANT CHANGE UP (ref 5–17)
AST SERPL-CCNC: 29 U/L — SIGNIFICANT CHANGE UP (ref 10–40)
BILIRUB SERPL-MCNC: 0.8 MG/DL — SIGNIFICANT CHANGE UP (ref 0.2–1.2)
BLD GP AB SCN SERPL QL: NEGATIVE — SIGNIFICANT CHANGE UP
BUN SERPL-MCNC: 16 MG/DL — SIGNIFICANT CHANGE UP (ref 7–23)
CALCIUM SERPL-MCNC: 10.2 MG/DL — SIGNIFICANT CHANGE UP (ref 8.4–10.5)
CHLORIDE SERPL-SCNC: 97 MMOL/L — SIGNIFICANT CHANGE UP (ref 96–108)
CO2 SERPL-SCNC: 24 MMOL/L — SIGNIFICANT CHANGE UP (ref 22–31)
CREAT SERPL-MCNC: 0.73 MG/DL — SIGNIFICANT CHANGE UP (ref 0.5–1.3)
EGFR: 107 ML/MIN/1.73M2 — SIGNIFICANT CHANGE UP
GLUCOSE SERPL-MCNC: 139 MG/DL — HIGH (ref 70–99)
HCT VFR BLD CALC: 44 % — SIGNIFICANT CHANGE UP (ref 39–50)
HGB BLD-MCNC: 14.6 G/DL — SIGNIFICANT CHANGE UP (ref 13–17)
MCHC RBC-ENTMCNC: 28.9 PG — SIGNIFICANT CHANGE UP (ref 27–34)
MCHC RBC-ENTMCNC: 33.2 GM/DL — SIGNIFICANT CHANGE UP (ref 32–36)
MCV RBC AUTO: 87 FL — SIGNIFICANT CHANGE UP (ref 80–100)
NRBC # BLD: 0 /100 WBCS — SIGNIFICANT CHANGE UP (ref 0–0)
PLATELET # BLD AUTO: 257 K/UL — SIGNIFICANT CHANGE UP (ref 150–400)
POTASSIUM SERPL-MCNC: 3.9 MMOL/L — SIGNIFICANT CHANGE UP (ref 3.5–5.3)
POTASSIUM SERPL-SCNC: 3.9 MMOL/L — SIGNIFICANT CHANGE UP (ref 3.5–5.3)
PROT SERPL-MCNC: 8.2 G/DL — SIGNIFICANT CHANGE UP (ref 6–8.3)
RBC # BLD: 5.06 M/UL — SIGNIFICANT CHANGE UP (ref 4.2–5.8)
RBC # FLD: 13.7 % — SIGNIFICANT CHANGE UP (ref 10.3–14.5)
RH IG SCN BLD-IMP: POSITIVE — SIGNIFICANT CHANGE UP
SODIUM SERPL-SCNC: 137 MMOL/L — SIGNIFICANT CHANGE UP (ref 135–145)
WBC # BLD: 7.62 K/UL — SIGNIFICANT CHANGE UP (ref 3.8–10.5)
WBC # FLD AUTO: 7.62 K/UL — SIGNIFICANT CHANGE UP (ref 3.8–10.5)

## 2023-02-16 PROCEDURE — 83036 HEMOGLOBIN GLYCOSYLATED A1C: CPT

## 2023-02-16 PROCEDURE — 80053 COMPREHEN METABOLIC PANEL: CPT

## 2023-02-16 PROCEDURE — 86900 BLOOD TYPING SEROLOGIC ABO: CPT

## 2023-02-16 PROCEDURE — 85027 COMPLETE CBC AUTOMATED: CPT

## 2023-02-16 PROCEDURE — G0463: CPT

## 2023-02-16 PROCEDURE — 86901 BLOOD TYPING SEROLOGIC RH(D): CPT

## 2023-02-16 PROCEDURE — 86850 RBC ANTIBODY SCREEN: CPT

## 2023-02-16 RX ORDER — SODIUM CHLORIDE 9 MG/ML
3 INJECTION INTRAMUSCULAR; INTRAVENOUS; SUBCUTANEOUS EVERY 8 HOURS
Refills: 0 | Status: DISCONTINUED | OUTPATIENT
Start: 2023-02-28 | End: 2023-02-28

## 2023-02-16 RX ORDER — METFORMIN HYDROCHLORIDE 850 MG/1
1 TABLET ORAL
Qty: 0 | Refills: 0 | DISCHARGE

## 2023-02-16 RX ORDER — CHOLECALCIFEROL (VITAMIN D3) 125 MCG
1 CAPSULE ORAL
Qty: 0 | Refills: 0 | DISCHARGE

## 2023-02-16 RX ORDER — LIDOCAINE HCL 20 MG/ML
0.2 VIAL (ML) INJECTION ONCE
Refills: 0 | Status: DISCONTINUED | OUTPATIENT
Start: 2023-02-28 | End: 2023-02-28

## 2023-02-16 NOTE — H&P PST ADULT - NSICDXPASTMEDICALHX_GEN_ALL_CORE_FT
PAST MEDICAL HISTORY:  Arthritis     DM (diabetes mellitus)     Hiatal hernia     HLD (hyperlipidemia)     CONCEPCIÓN (obstructive sleep apnea) uses CPAP    Psoriasis     SVT (supraventricular tachycardia) s/p ablation    Vitamin D deficiency      PAST MEDICAL HISTORY:  Arthritis     DM (diabetes mellitus)     Hiatal hernia     HLD (hyperlipidemia)     CONCEPCIÓN (obstructive sleep apnea) uses CPAP    Psoriasis     Psoriatic arthritis     SVT (supraventricular tachycardia) s/p ablation    Vitamin D deficiency

## 2023-02-16 NOTE — H&P PST ADULT - ASSESSMENT
CAPRINI SCORE [CLOT]    AGE RELATED RISK FACTORS                                                       MOBILITY RELATED FACTORS  [xx ] Age 41-60 years                                            (1 Point)                  [ ] Bed rest                                                        (1 Point)  [ ] Age: 61-74 years                                           (2 Points)                 [ ] Plaster cast                                                   (2 Points)  [ ] Age= 75 years                                              (3 Points)                 [ ] Bed bound for more than 72 hours                 (2 Points)    DISEASE RELATED RISK FACTORS                                               GENDER SPECIFIC FACTORS  [ ] Edema in the lower extremities                       (1 Point)                  [ ] Pregnancy                                                     (1 Point)  [ ] Varicose veins                                               (1 Point)                  [ ] Post-partum < 6 weeks                                   (1 Point)             [xx ] BMI > 25 Kg/m2                                            (1 Point)                  [ ] Hormonal therapy  or oral contraception          (1 Point)                 [ ] Sepsis (in the previous month)                        (1 Point)                  [ ] History of pregnancy complications                 (1 point)  [ ] Pneumonia or serious lung disease                                               [ ] Unexplained or recurrent                     (1 Point)           (in the previous month)                               (1 Point)  [ ] Abnormal pulmonary function test                     (1 Point)                 SURGERY RELATED RISK FACTORS  [ ] Acute myocardial infarction                              (1 Point)                 [ ]  Section                                             (1 Point)  [ ] Congestive heart failure (in the previous month)  (1 Point)               [ ] Minor surgery                                                  (1 Point)   [ ] Inflammatory bowel disease                             (1 Point)                 [ ] Arthroscopic surgery                                        (2 Points)  [ ] Central venous access                                      (2 Points)                [ ] General surgery lasting more than 45 minutes   (2 Points)       [ ] Stroke (in the previous month)                          (5 Points)               [ ] Elective arthroplasty                                         (5 Points)                                                                                                                                               HEMATOLOGY RELATED FACTORS                                                 TRAUMA RELATED RISK FACTORS  [ ] Prior episodes of VTE                                     (3 Points)                [ ] Fracture of the hip, pelvis, or leg                       (5 Points)  [ ] Positive family history for VTE                         (3 Points)                 [ ] Acute spinal cord injury (in the previous month)  (5 Points)  [ ] Prothrombin 15375 A                                     (3 Points)                 [ ] Paralysis  (less than 1 month)                             (5 Points)  [ ] Factor V Leiden                                             (3 Points)                  [ ] Multiple Trauma within 1 month                        (5 Points)  [ ] Lupus anticoagulants                                     (3 Points)                                                           [ ] Anticardiolipin antibodies                               (3 Points)                                                       [ ] High homocysteine in the blood                      (3 Points)                                             [ ] Other congenital or acquired thrombophilia      (3 Points)                                                [ ] Heparin induced thrombocytopenia                  (3 Points)                                          Total Score [ 2 ]

## 2023-02-16 NOTE — H&P PST ADULT - MUSCULOSKELETAL
negative normal/ROM intact/no joint swelling/normal gait/strength 5/5 bilateral upper extremities/strength 5/5 bilateral lower extremities

## 2023-02-16 NOTE — H&P PST ADULT - ADMIT DATE
16-Feb-2023 Complex Repair And M Plasty Text: The defect edges were debeveled with a #15 scalpel blade.  The primary defect was closed partially with a complex linear closure.  Given the location of the remaining defect, shape of the defect and the proximity to free margins an M plasty was deemed most appropriate for complete closure of the defect.  Using a sterile surgical marker, an appropriate advancement flap was drawn incorporating the defect and placing the expected incisions within the relaxed skin tension lines where possible.    The area thus outlined was incised deep to adipose tissue with a #15 scalpel blade.  The skin margins were undermined to an appropriate distance in all directions utilizing iris scissors.

## 2023-02-16 NOTE — H&P PST ADULT - OTHER CARE PROVIDERS
none Endocrine: Dr. BALAJI Suarez [phone].   Dr. TIEN Ordaz ([facility]) [phone]

## 2023-02-16 NOTE — H&P PST ADULT - HISTORY OF PRESENT ILLNESS
Pfeizer booster.  Covid + December 2021.  Headache, bodyache, and night sweats.  Denies hospitalization.  This is a 57 year old male with DM, psoriatic arthritis, CONCEPCIÓN(on cpap), HLD, SVT (had ablation 10 years ago) currently on Metroprolol. Hx cardiac stents in 2020 (on ASA 81mg). Pt states he been off of plavix a few years now. .     Denies SOB, chestpain, headache, fever, chills, n/v vomiting.  Patient states he had cardiac and pulmonary eval on Feb 2023 for surgical clearance.  Per patient- No need to stop  Aspirin prior to sx per Cardiologist.       Scheduled for COVID testing at [facility] on 2/25/23.      Pfeizer booster.  Covid + December 2021.  Symptoms include: Headache, body ache and night sweats.  Denies hospitalization.  This is a 57 year old male with DM, psoriatic arthritis, CONCEPCIÓN(on cpap), HLD,  Vitamin D deficiency, SVT (had ablation 10 years ago) currently on Metroprolol. Hx cardiac stents in 2020 (on ASA 81mg). Pt states he's been off of plavix a few years now. .   Presenting to [facility] for Scheduled for Laparoscopic gastrectomy and Hiatal hernia repair on 2/28/23 by Dr. Vazquez.      Denies SOB, chestpain, palpitations, headache, fever, chills, n/v vomiting.  Patient states he had cardiac and pulmonary eval on Feb 2023 for surgical clearance will request reports  Per patient- No need to stop  Aspirin prior to sx per Cardiologist.       Scheduled for COVID testing at [facility] on 2/25/23.      Pfeizer booster.  Covid + December 2021.  Symptoms include: Headache, body ache and night sweats.  Denies hospitalization.  This is a 57 year old male with DM, psoriatic arthritis, CONCEPCIÓN(on cpap), HLD,  Vitamin D deficiency, SVT (had ablation 10 years ago) currently on Metroprolol. Hx cardiac stents in 2020 (on ASA 81mg). Pt states he's been off of plavix a few years now.   Presenting to [facility] for Scheduled for Laparoscopic gastrectomy and Hiatal hernia repair on 2/28/23 by Dr. Vazquez.      Denies SOB, chestpain, palpitations, headache, fever, chills, n/v vomiting.  Patient states he had cardiac and pulmonary eval on Feb 2023 for surgical clearance will request reports  Per patient- No need to stop  Aspirin prior to sx per Cardiologist.       Scheduled for COVID testing at [facility] on 2/25/23.      Pfeizer booster.  Covid + December 2021.  Symptoms include: Headache, body ache and night sweats.  Denies hospitalization.  This is a 57 year old male with DM, psoriatic arthritis, CONCEPCIÓN(on cpap), HLD,  Vitamin D deficiency, SVT (had ablation 10 years ago) currently on Metroprolol. Hx cardiac stent x 1  in 3/2020 (on ASA 81mg). Pt states he's been off of plavix a few years now.   Presenting to [facility] for Scheduled for Laparoscopic gastrectomy and Hiatal hernia repair on 2/28/23 by Dr. Vazquez.      Denies SOB, chestpain, palpitations, headache, fever, chills, n/v vomiting.  Patient states he had cardiac and pulmonary eval on Feb 2023 for surgical clearance will request reports  Per patient- No need to stop  Aspirin prior to sx per Cardiologist.       Scheduled for COVID testing at [facility] on 2/25/23.      Pfeizer booster.  Covid + December 2021.  Symptoms include: Headache, body ache and night sweats.  Denies hospitalization.

## 2023-02-16 NOTE — H&P PST ADULT - PROBLEM SELECTOR PLAN 3
Scheduled for Laparoscopic gastrectomy and Hiatal hernia repair on 2/28/23 by Dr. Vazquez.    Patient instructions and Chlorhexidine given to patient.  Labs: CBC, cmp, T/S, HA1C drawn in PST.  Covid testing scheduled at [facility] on 2/25.    Patient was evaluated by Cardiologist on 2/2023- Please obtain clearance.  Patient was evaluated by Pulmologist on 2/2023- Please obtain clearance. Scheduled for Laparoscopic gastrectomy and Hiatal hernia repair on 2/28/23 by Dr. Vazquez.    Pre-operative instructions and Chlorhexidine given to patient.  Labs: CBC, cmp, T/S, HA1C drawn in PST.  Covid testing scheduled at [facility] on 2/25.    Patient was evaluated by Cardiologist on 2/2023- Please obtain clearance.  Patient was evaluated by Pulmologist on 2/2023- Please obtain clearance.

## 2023-02-16 NOTE — H&P PST ADULT - PROBLEM SELECTOR PLAN 1
On CPAP. BMI >40  Anesthesia notified.  Patient was evaluated by Pulmologist on 2/2023- Please obtain clearance record from Dr. Stock. On CPAP. BMI >40  Neck Circumference 21.5cm   Anesthesia notified.  Patient was evaluated by Pulmonologist on 2/2023- Please obtain clearance record from Dr. Stock.

## 2023-02-17 LAB
A1C WITH ESTIMATED AVERAGE GLUCOSE RESULT: 8.9 % — HIGH (ref 4–5.6)
ESTIMATED AVERAGE GLUCOSE: 209 MG/DL — HIGH (ref 68–114)

## 2023-02-24 ENCOUNTER — APPOINTMENT (OUTPATIENT)
Dept: ENDOCRINOLOGY | Facility: CLINIC | Age: 58
End: 2023-02-24
Payer: COMMERCIAL

## 2023-02-24 VITALS
OXYGEN SATURATION: 96 % | BODY MASS INDEX: 41.16 KG/M2 | SYSTOLIC BLOOD PRESSURE: 110 MMHG | HEART RATE: 63 BPM | HEIGHT: 71 IN | DIASTOLIC BLOOD PRESSURE: 80 MMHG | WEIGHT: 294 LBS | TEMPERATURE: 98 F

## 2023-02-24 PROBLEM — L40.50 ARTHROPATHIC PSORIASIS, UNSPECIFIED: Chronic | Status: ACTIVE | Noted: 2023-02-16

## 2023-02-24 PROBLEM — K44.9 DIAPHRAGMATIC HERNIA WITHOUT OBSTRUCTION OR GANGRENE: Chronic | Status: ACTIVE | Noted: 2023-02-16

## 2023-02-24 LAB
GLUCOSE BLDC GLUCOMTR-MCNC: 154
HBA1C MFR BLD HPLC: 8.3

## 2023-02-24 PROCEDURE — 83036 HEMOGLOBIN GLYCOSYLATED A1C: CPT | Mod: QW

## 2023-02-24 PROCEDURE — 99214 OFFICE O/P EST MOD 30 MIN: CPT

## 2023-02-24 PROCEDURE — 82962 GLUCOSE BLOOD TEST: CPT

## 2023-02-24 NOTE — ADDENDUM
[FreeTextEntry1] : Patient with type 2 DM hx.  A1c is not optimal for long term control. However, with recent bg control much improved. patient is stable from an endocrine standpoint for the planned procedure.  He will hold Metformin day before surgery and resume thereafter. We will stay in close contact post operatively.

## 2023-02-24 NOTE — HISTORY OF PRESENT ILLNESS
[FreeTextEntry1] : Mr. JUSTICE  is a 57 year old  male who  returns today in follow up with regard to a history of type 2 diabetes mellitus He is pending gastric sleeve surgery per  next week No ov since 4/15/2022.\par \par A1c 8.8 %\par However lately bg 120-130's  Is on liquid higher protein diet.\par \par  The diabetes mellitus was dx about t three and half years ago.  There is no known history of retinopathy, nephropathy. He  too denies any history of neuropathy.\par \par  Current dm medication include Metformin 500 mg 2 bid  was to be on Farxiga 10 mg daily but states he didn’t realize  he was supposed to pick that rx from his pharmacy back from last September.. Was on Ozempic in the past, \par \par .HGM of late has shown values 120-140 There has been no significant hypoglycemia.  \par \par He denies  any chest pain, sob, neurologic or ophthalmologic complaints. He  too denies any new podiatric concerns. He  is up to date with his ophthalmologic visit.  \par \par Coronary stent placed 3/4/2020. No c/p or sob.\par \par Additional medical history includes that of  hyperlipidemia.Does take otc vit d. Is on Humira per Dr. Buck for underlying psoriatic arthritis and psoriasis.\par Re the cad and  coronary stent, he is   on Plavix and and and metoprolol.  Too is on Atorvastatin 40 mg daily along with ASA 81 mg daily.\par \par Opho neg \par On otc vit d 3\par Sedentary -not exercising much.\par Optho Dr. Marte-no retinopathy\par \par \par

## 2023-02-25 ENCOUNTER — OUTPATIENT (OUTPATIENT)
Dept: OUTPATIENT SERVICES | Facility: HOSPITAL | Age: 58
LOS: 1 days | End: 2023-02-25
Payer: COMMERCIAL

## 2023-02-25 DIAGNOSIS — Q68.6 DISCOID MENISCUS: Chronic | ICD-10-CM

## 2023-02-25 DIAGNOSIS — Z98.890 OTHER SPECIFIED POSTPROCEDURAL STATES: Chronic | ICD-10-CM

## 2023-02-25 DIAGNOSIS — Z11.52 ENCOUNTER FOR SCREENING FOR COVID-19: ICD-10-CM

## 2023-02-25 LAB — SARS-COV-2 RNA SPEC QL NAA+PROBE: DETECTED

## 2023-02-27 ENCOUNTER — TRANSCRIPTION ENCOUNTER (OUTPATIENT)
Age: 58
End: 2023-02-27

## 2023-02-27 NOTE — PHARMACOTHERAPY INTERVENTION NOTE - COMMENTS
Initial Pharmacy Review  56 yo M with DM, psoriatic arthritis, CONCEPCIÓN(on cpap), HLD, Vitamin D deficiency, SVT (ablation 10 years ago) currently on metroprolol, Hx cardiac stent x 1 in 3/2020 (on ASA 81mg). Plan for laparoscopic gastrectomy & hiatal hernia repair on 2/28/23.    Patient currently taking:   aspirin 81 mg oral delayed release tablet: 1 tab(s) orally once a day - switch to chewable tabs  atorvastatin 40 mg oral tablet: 1 tab(s) orally once a day - crush  Humira Pen 40 mg/0.8 mL subcutaneous kit: every two weeks  metFORMIN 500 mg oral tablet: 2 tab(s) orally 2 times a day - crush  Metoprolol Succinate ER 25 mg oral tablet, extended release: 1 tab(s) orally once a day  Vitamin D3 50 mcg (2000 intl units) oral tablet: 1 tab(s) orally once a day    Discharge recommendations:  Switch aspirin EC to aspirin 81mg chewable tabs daily  Crush atorvastatin and metformin tabs  Continue vitamins/supplements in chewable/dissolvable forms   Pending discussion for metoprolol XL    Zachariah Hui, PharmD, BCPS  [phone]  Available on [employer] Teams

## 2023-02-28 ENCOUNTER — INPATIENT (INPATIENT)
Facility: HOSPITAL | Age: 58
LOS: 0 days | Discharge: HOME | DRG: 621 | End: 2023-03-01
Attending: SURGERY | Admitting: SURGERY
Payer: COMMERCIAL

## 2023-02-28 ENCOUNTER — APPOINTMENT (OUTPATIENT)
Dept: SURGERY | Facility: HOSPITAL | Age: 58
End: 2023-02-28
Payer: COMMERCIAL

## 2023-02-28 ENCOUNTER — RESULT REVIEW (OUTPATIENT)
Age: 58
End: 2023-02-28

## 2023-02-28 VITALS
TEMPERATURE: 98 F | HEART RATE: 57 BPM | SYSTOLIC BLOOD PRESSURE: 158 MMHG | RESPIRATION RATE: 18 BRPM | DIASTOLIC BLOOD PRESSURE: 81 MMHG | OXYGEN SATURATION: 95 % | WEIGHT: 291.01 LBS | HEIGHT: 70.98 IN

## 2023-02-28 DIAGNOSIS — E66.01 MORBID (SEVERE) OBESITY DUE TO EXCESS CALORIES: ICD-10-CM

## 2023-02-28 DIAGNOSIS — K44.9 DIAPHRAGMATIC HERNIA WITHOUT OBSTRUCTION OR GANGRENE: ICD-10-CM

## 2023-02-28 DIAGNOSIS — Q68.6 DISCOID MENISCUS: Chronic | ICD-10-CM

## 2023-02-28 DIAGNOSIS — Z98.890 OTHER SPECIFIED POSTPROCEDURAL STATES: Chronic | ICD-10-CM

## 2023-02-28 LAB
BASOPHILS # BLD AUTO: 0.07 K/UL — SIGNIFICANT CHANGE UP (ref 0–0.2)
BASOPHILS NFR BLD AUTO: 0.9 % — SIGNIFICANT CHANGE UP (ref 0–2)
EOSINOPHIL # BLD AUTO: 0.09 K/UL — SIGNIFICANT CHANGE UP (ref 0–0.5)
EOSINOPHIL NFR BLD AUTO: 1.2 % — SIGNIFICANT CHANGE UP (ref 0–6)
GLUCOSE BLDC GLUCOMTR-MCNC: 115 MG/DL — HIGH (ref 70–99)
GLUCOSE BLDC GLUCOMTR-MCNC: 127 MG/DL — HIGH (ref 70–99)
GLUCOSE BLDC GLUCOMTR-MCNC: 161 MG/DL — HIGH (ref 70–99)
HCT VFR BLD CALC: 41.2 % — SIGNIFICANT CHANGE UP (ref 39–50)
HGB BLD-MCNC: 13.5 G/DL — SIGNIFICANT CHANGE UP (ref 13–17)
IMM GRANULOCYTES NFR BLD AUTO: 0.3 % — SIGNIFICANT CHANGE UP (ref 0–0.9)
LYMPHOCYTES # BLD AUTO: 1.39 K/UL — SIGNIFICANT CHANGE UP (ref 1–3.3)
LYMPHOCYTES # BLD AUTO: 17.9 % — SIGNIFICANT CHANGE UP (ref 13–44)
MCHC RBC-ENTMCNC: 28.5 PG — SIGNIFICANT CHANGE UP (ref 27–34)
MCHC RBC-ENTMCNC: 32.8 GM/DL — SIGNIFICANT CHANGE UP (ref 32–36)
MCV RBC AUTO: 87.1 FL — SIGNIFICANT CHANGE UP (ref 80–100)
MONOCYTES # BLD AUTO: 0.17 K/UL — SIGNIFICANT CHANGE UP (ref 0–0.9)
MONOCYTES NFR BLD AUTO: 2.2 % — SIGNIFICANT CHANGE UP (ref 2–14)
NEUTROPHILS # BLD AUTO: 6.02 K/UL — SIGNIFICANT CHANGE UP (ref 1.8–7.4)
NEUTROPHILS NFR BLD AUTO: 77.5 % — HIGH (ref 43–77)
NRBC # BLD: 0 /100 WBCS — SIGNIFICANT CHANGE UP (ref 0–0)
PLATELET # BLD AUTO: 203 K/UL — SIGNIFICANT CHANGE UP (ref 150–400)
RBC # BLD: 4.73 M/UL — SIGNIFICANT CHANGE UP (ref 4.2–5.8)
RBC # FLD: 13.6 % — SIGNIFICANT CHANGE UP (ref 10.3–14.5)
SARS-COV-2 RNA SPEC QL NAA+PROBE: SIGNIFICANT CHANGE UP
SARS-COV-2 RNA SPEC QL NAA+PROBE: SIGNIFICANT CHANGE UP
WBC # BLD: 7.76 K/UL — SIGNIFICANT CHANGE UP (ref 3.8–10.5)
WBC # FLD AUTO: 7.76 K/UL — SIGNIFICANT CHANGE UP (ref 3.8–10.5)

## 2023-02-28 PROCEDURE — 43775 LAP SLEEVE GASTRECTOMY: CPT

## 2023-02-28 PROCEDURE — C9803: CPT

## 2023-02-28 PROCEDURE — 43235 EGD DIAGNOSTIC BRUSH WASH: CPT

## 2023-02-28 PROCEDURE — U0003: CPT

## 2023-02-28 PROCEDURE — U0005: CPT

## 2023-02-28 PROCEDURE — 88307 TISSUE EXAM BY PATHOLOGIST: CPT | Mod: 26

## 2023-02-28 DEVICE — STAPLER COVIDIEN TRI-STAPLE 60MM PURPLE INTELLIGENT RELOAD: Type: IMPLANTABLE DEVICE | Status: FUNCTIONAL

## 2023-02-28 DEVICE — STAPLER COVIDIEN TRI-STAPLE 60MM BLACK INTELLIGENT RELOAD: Type: IMPLANTABLE DEVICE | Status: FUNCTIONAL

## 2023-02-28 DEVICE — STAPLER COVIDIEN TRI-STAPLE 45MM PURPLE RELOAD: Type: IMPLANTABLE DEVICE | Status: FUNCTIONAL

## 2023-02-28 RX ORDER — SODIUM CHLORIDE 9 MG/ML
1000 INJECTION, SOLUTION INTRAVENOUS
Refills: 0 | Status: DISCONTINUED | OUTPATIENT
Start: 2023-02-28 | End: 2023-03-01

## 2023-02-28 RX ORDER — METOPROLOL TARTRATE 50 MG
25 TABLET ORAL DAILY
Refills: 0 | Status: DISCONTINUED | OUTPATIENT
Start: 2023-03-01 | End: 2023-03-01

## 2023-02-28 RX ORDER — HYOSCYAMINE SULFATE 0.13 MG
0.12 TABLET ORAL EVERY 4 HOURS
Refills: 0 | Status: DISCONTINUED | OUTPATIENT
Start: 2023-02-28 | End: 2023-03-01

## 2023-02-28 RX ORDER — CHOLECALCIFEROL (VITAMIN D3) 125 MCG
1 CAPSULE ORAL
Qty: 0 | Refills: 0 | DISCHARGE

## 2023-02-28 RX ORDER — ONDANSETRON 8 MG/1
4 TABLET, FILM COATED ORAL ONCE
Refills: 0 | Status: DISCONTINUED | OUTPATIENT
Start: 2023-02-28 | End: 2023-02-28

## 2023-02-28 RX ORDER — HYDROMORPHONE HYDROCHLORIDE 2 MG/ML
0.5 INJECTION INTRAMUSCULAR; INTRAVENOUS; SUBCUTANEOUS
Refills: 0 | Status: DISCONTINUED | OUTPATIENT
Start: 2023-02-28 | End: 2023-02-28

## 2023-02-28 RX ORDER — INSULIN LISPRO 100/ML
VIAL (ML) SUBCUTANEOUS
Refills: 0 | Status: DISCONTINUED | OUTPATIENT
Start: 2023-02-28 | End: 2023-02-28

## 2023-02-28 RX ORDER — CEFAZOLIN SODIUM 1 G
3000 VIAL (EA) INJECTION ONCE
Refills: 0 | Status: COMPLETED | OUTPATIENT
Start: 2023-02-28 | End: 2023-02-28

## 2023-02-28 RX ORDER — PANTOPRAZOLE SODIUM 20 MG/1
40 TABLET, DELAYED RELEASE ORAL EVERY 24 HOURS
Refills: 0 | Status: DISCONTINUED | OUTPATIENT
Start: 2023-02-28 | End: 2023-03-01

## 2023-02-28 RX ORDER — ATORVASTATIN CALCIUM 80 MG/1
1 TABLET, FILM COATED ORAL
Qty: 0 | Refills: 0 | DISCHARGE

## 2023-02-28 RX ORDER — DEXTROSE 50 % IN WATER 50 %
25 SYRINGE (ML) INTRAVENOUS ONCE
Refills: 0 | Status: DISCONTINUED | OUTPATIENT
Start: 2023-02-28 | End: 2023-03-01

## 2023-02-28 RX ORDER — CHLORHEXIDINE GLUCONATE 213 G/1000ML
1 SOLUTION TOPICAL ONCE
Refills: 0 | Status: COMPLETED | OUTPATIENT
Start: 2023-02-28 | End: 2023-02-28

## 2023-02-28 RX ORDER — ACETAMINOPHEN 500 MG
1000 TABLET ORAL ONCE
Refills: 0 | Status: COMPLETED | OUTPATIENT
Start: 2023-03-01 | End: 2023-03-01

## 2023-02-28 RX ORDER — ADALIMUMAB 40MG/0.8ML
0 KIT SUBCUTANEOUS
Qty: 0 | Refills: 0 | DISCHARGE

## 2023-02-28 RX ORDER — ONDANSETRON 8 MG/1
4 TABLET, FILM COATED ORAL EVERY 6 HOURS
Refills: 0 | Status: DISCONTINUED | OUTPATIENT
Start: 2023-02-28 | End: 2023-03-01

## 2023-02-28 RX ORDER — ACETAMINOPHEN 500 MG
1000 TABLET ORAL ONCE
Refills: 0 | Status: DISCONTINUED | OUTPATIENT
Start: 2023-03-01 | End: 2023-03-01

## 2023-02-28 RX ORDER — GLUCAGON INJECTION, SOLUTION 0.5 MG/.1ML
1 INJECTION, SOLUTION SUBCUTANEOUS ONCE
Refills: 0 | Status: DISCONTINUED | OUTPATIENT
Start: 2023-02-28 | End: 2023-03-01

## 2023-02-28 RX ORDER — DEXTROSE 50 % IN WATER 50 %
15 SYRINGE (ML) INTRAVENOUS ONCE
Refills: 0 | Status: DISCONTINUED | OUTPATIENT
Start: 2023-02-28 | End: 2023-03-01

## 2023-02-28 RX ORDER — FOSAPREPITANT DIMEGLUMINE 150 MG/5ML
150 INJECTION, POWDER, LYOPHILIZED, FOR SOLUTION INTRAVENOUS ONCE
Refills: 0 | Status: COMPLETED | OUTPATIENT
Start: 2023-02-28 | End: 2023-02-28

## 2023-02-28 RX ORDER — KETOROLAC TROMETHAMINE 30 MG/ML
15 SYRINGE (ML) INJECTION EVERY 6 HOURS
Refills: 0 | Status: COMPLETED | OUTPATIENT
Start: 2023-02-28 | End: 2023-03-01

## 2023-02-28 RX ORDER — HEPARIN SODIUM 5000 [USP'U]/ML
5000 INJECTION INTRAVENOUS; SUBCUTANEOUS ONCE
Refills: 0 | Status: COMPLETED | OUTPATIENT
Start: 2023-02-28 | End: 2023-02-28

## 2023-02-28 RX ORDER — CEFAZOLIN SODIUM 1 G
3000 VIAL (EA) INJECTION EVERY 8 HOURS
Refills: 0 | Status: COMPLETED | OUTPATIENT
Start: 2023-02-28 | End: 2023-03-01

## 2023-02-28 RX ORDER — FOLIC ACID 0.8 MG
1 TABLET ORAL ONCE
Refills: 0 | Status: COMPLETED | OUTPATIENT
Start: 2023-02-28 | End: 2023-02-28

## 2023-02-28 RX ORDER — HEPARIN SODIUM 5000 [USP'U]/ML
5000 INJECTION INTRAVENOUS; SUBCUTANEOUS EVERY 8 HOURS
Refills: 0 | Status: DISCONTINUED | OUTPATIENT
Start: 2023-02-28 | End: 2023-03-01

## 2023-02-28 RX ORDER — HYDROMORPHONE HYDROCHLORIDE 2 MG/ML
1 INJECTION INTRAMUSCULAR; INTRAVENOUS; SUBCUTANEOUS
Refills: 0 | Status: DISCONTINUED | OUTPATIENT
Start: 2023-02-28 | End: 2023-02-28

## 2023-02-28 RX ORDER — METFORMIN HYDROCHLORIDE 850 MG/1
2 TABLET ORAL
Qty: 0 | Refills: 0 | DISCHARGE

## 2023-02-28 RX ORDER — FENTANYL CITRATE 50 UG/ML
50 INJECTION INTRAVENOUS ONCE
Refills: 0 | Status: DISCONTINUED | OUTPATIENT
Start: 2023-02-28 | End: 2023-02-28

## 2023-02-28 RX ORDER — MAGNESIUM SULFATE 500 MG/ML
2 VIAL (ML) INJECTION ONCE
Refills: 0 | Status: COMPLETED | OUTPATIENT
Start: 2023-02-28 | End: 2023-02-28

## 2023-02-28 RX ORDER — FENTANYL CITRATE 50 UG/ML
25 INJECTION INTRAVENOUS
Refills: 0 | Status: DISCONTINUED | OUTPATIENT
Start: 2023-02-28 | End: 2023-02-28

## 2023-02-28 RX ORDER — INSULIN LISPRO 100/ML
VIAL (ML) SUBCUTANEOUS
Refills: 0 | Status: DISCONTINUED | OUTPATIENT
Start: 2023-02-28 | End: 2023-03-01

## 2023-02-28 RX ORDER — ACETAMINOPHEN 500 MG
1000 TABLET ORAL ONCE
Refills: 0 | Status: COMPLETED | OUTPATIENT
Start: 2023-02-28 | End: 2023-02-28

## 2023-02-28 RX ORDER — THIAMINE MONONITRATE (VIT B1) 100 MG
100 TABLET ORAL ONCE
Refills: 0 | Status: COMPLETED | OUTPATIENT
Start: 2023-02-28 | End: 2023-02-28

## 2023-02-28 RX ADMIN — Medication 400 MILLIGRAM(S): at 21:28

## 2023-02-28 RX ADMIN — PANTOPRAZOLE SODIUM 40 MILLIGRAM(S): 20 TABLET, DELAYED RELEASE ORAL at 17:57

## 2023-02-28 RX ADMIN — HEPARIN SODIUM 5000 UNIT(S): 5000 INJECTION INTRAVENOUS; SUBCUTANEOUS at 21:28

## 2023-02-28 RX ADMIN — Medication 25 GRAM(S): at 23:01

## 2023-02-28 RX ADMIN — Medication 200 MILLIGRAM(S): at 23:42

## 2023-02-28 RX ADMIN — Medication 100 MILLIGRAM(S): at 17:58

## 2023-02-28 RX ADMIN — CHLORHEXIDINE GLUCONATE 1 APPLICATION(S): 213 SOLUTION TOPICAL at 12:19

## 2023-02-28 RX ADMIN — ONDANSETRON 4 MILLIGRAM(S): 8 TABLET, FILM COATED ORAL at 17:57

## 2023-02-28 RX ADMIN — FOSAPREPITANT DIMEGLUMINE 300 MILLIGRAM(S): 150 INJECTION, POWDER, LYOPHILIZED, FOR SOLUTION INTRAVENOUS at 12:11

## 2023-02-28 RX ADMIN — SODIUM CHLORIDE 250 MILLILITER(S): 9 INJECTION, SOLUTION INTRAVENOUS at 19:40

## 2023-02-28 RX ADMIN — Medication 0.12 MILLIGRAM(S): at 17:58

## 2023-02-28 RX ADMIN — ONDANSETRON 4 MILLIGRAM(S): 8 TABLET, FILM COATED ORAL at 23:04

## 2023-02-28 RX ADMIN — HEPARIN SODIUM 5000 UNIT(S): 5000 INJECTION INTRAVENOUS; SUBCUTANEOUS at 12:18

## 2023-02-28 RX ADMIN — FENTANYL CITRATE 50 MICROGRAM(S): 50 INJECTION INTRAVENOUS at 20:14

## 2023-02-28 RX ADMIN — Medication 1 MILLIGRAM(S): at 19:41

## 2023-02-28 RX ADMIN — Medication 1000 MILLIGRAM(S): at 21:43

## 2023-02-28 RX ADMIN — FENTANYL CITRATE 50 MICROGRAM(S): 50 INJECTION INTRAVENOUS at 20:30

## 2023-02-28 RX ADMIN — Medication 0.12 MILLIGRAM(S): at 21:28

## 2023-02-28 NOTE — PATIENT PROFILE ADULT - DIETITIAN.
RN covering primary RN Zeke break: Pt resting comfortably in bed at this time. Instructed to provide clean urine sample when ready and provided with specimen cup and wipe. Per MD Wolf okay to take at home antibiotics at bedside. Second liter of LR started. To repeat blood work after LR is complete. Pending dispo.
dietitian/nutrition services

## 2023-02-28 NOTE — BRIEF OPERATIVE NOTE - NSICDXBRIEFPROCEDURE_GEN_ALL_CORE_FT
PROCEDURES:  Robot-assisted sleeve gastrectomy 28-Feb-2023 16:49:08  Dannie Sharif  EGD 28-Feb-2023 16:50:32  Dannie Sharif

## 2023-02-28 NOTE — BRIEF OPERATIVE NOTE - OPERATION/FINDINGS
Sleeve gastrectomy performed over 36 Fr ViSiGi. EGD performed with negative leak test, with unremarkable and hemostatic internal staple line and esophagus. Hemostasis achieved.

## 2023-02-28 NOTE — BRIEF OPERATIVE NOTE - SPECIMENS
Portion of stomach DISPLAY PLAN FREE TEXT DISPLAY PLAN FREE TEXT DISPLAY PLAN FREE TEXT DISPLAY PLAN FREE TEXT DISPLAY PLAN FREE TEXT DISPLAY PLAN FREE TEXT

## 2023-02-28 NOTE — PATIENT PROFILE ADULT - FALL HARM RISK - HARM RISK INTERVENTIONS

## 2023-02-28 NOTE — CHART NOTE - NSCHARTNOTEFT_GEN_A_CORE
Surgery Post-Op Note    Pre-Op Dx: Morbid obesity    Procedure: Robot-assisted sleeve gastrectomy    EGD      Surgeon: Taylor    SUBJECTIVE:  Pt seen and examined at the bedside. Denies pain, endorses intermittent nausea that has now resolved.    OBJECTIVE:  Vital Signs Last 24 Hrs  T(C): 36.2 (28 Feb 2023 20:26), Max: 36.9 (28 Feb 2023 11:40)  T(F): 97.2 (28 Feb 2023 20:26), Max: 98.4 (28 Feb 2023 11:40)  HR: 56 (28 Feb 2023 21:45) (50 - 62)  BP: 151/70 (28 Feb 2023 21:34) (151/70 - 189/91)  BP(mean): 101 (28 Feb 2023 21:34) (101 - 131)  RR: 16 (28 Feb 2023 21:45) (15 - 18)  SpO2: 97% (28 Feb 2023 21:45) (93% - 100%)    Parameters below as of 28 Feb 2023 21:45  Patient On (Oxygen Delivery Method): nasal cannula  O2 Flow (L/min): 2      Physical Exam:  General: NAD, resting comfortably in bed  Neuro: A/O x 3, no focal deficits  Pulmonary: No respiratory distress, 2LNC  Abdominal: soft, obese, port sites cdi  Extremities: WWP    LABS:                        13.5   7.76  )-----------( 203      ( 28 Feb 2023 17:26 )             41.2     02-28    140  |  101  |  8   ----------------------------<  135<H>  3.9   |  22  |  0.72    Ca    8.8      28 Feb 2023 17:26  Phos  3.3     02-28  Mg     1.8     02-28        CAPILLARY BLOOD GLUCOSE      POCT Blood Glucose.: 127 mg/dL (28 Feb 2023 17:21)  POCT Blood Glucose.: 115 mg/dL (28 Feb 2023 11:44)        ABO Interpretation: O (02-28 @ 12:32)        ASSESSMENT:57y Male now 4hours s/p Robot-assisted sleeve gastrectomy, EGD for morbid obesity    PLAN  - jayla clears  - pain control  - oob/amb

## 2023-03-01 ENCOUNTER — TRANSCRIPTION ENCOUNTER (OUTPATIENT)
Age: 58
End: 2023-03-01

## 2023-03-01 VITALS — WEIGHT: 291.01 LBS

## 2023-03-01 LAB
ANION GAP SERPL CALC-SCNC: 18 MMOL/L — HIGH (ref 5–17)
BASOPHILS # BLD AUTO: 0.01 K/UL — SIGNIFICANT CHANGE UP (ref 0–0.2)
BASOPHILS NFR BLD AUTO: 0.1 % — SIGNIFICANT CHANGE UP (ref 0–2)
BUN SERPL-MCNC: 10 MG/DL — SIGNIFICANT CHANGE UP (ref 7–23)
CALCIUM SERPL-MCNC: 8.9 MG/DL — SIGNIFICANT CHANGE UP (ref 8.4–10.5)
CHLORIDE SERPL-SCNC: 102 MMOL/L — SIGNIFICANT CHANGE UP (ref 96–108)
CO2 SERPL-SCNC: 19 MMOL/L — LOW (ref 22–31)
CREAT SERPL-MCNC: 0.65 MG/DL — SIGNIFICANT CHANGE UP (ref 0.5–1.3)
EGFR: 111 ML/MIN/1.73M2 — SIGNIFICANT CHANGE UP
EOSINOPHIL # BLD AUTO: 0 K/UL — SIGNIFICANT CHANGE UP (ref 0–0.5)
EOSINOPHIL NFR BLD AUTO: 0 % — SIGNIFICANT CHANGE UP (ref 0–6)
GLUCOSE BLDC GLUCOMTR-MCNC: 161 MG/DL — HIGH (ref 70–99)
GLUCOSE SERPL-MCNC: 171 MG/DL — HIGH (ref 70–99)
HCT VFR BLD CALC: 40.5 % — SIGNIFICANT CHANGE UP (ref 39–50)
HGB BLD-MCNC: 13.4 G/DL — SIGNIFICANT CHANGE UP (ref 13–17)
IMM GRANULOCYTES NFR BLD AUTO: 0.4 % — SIGNIFICANT CHANGE UP (ref 0–0.9)
LYMPHOCYTES # BLD AUTO: 1.13 K/UL — SIGNIFICANT CHANGE UP (ref 1–3.3)
LYMPHOCYTES # BLD AUTO: 14.5 % — SIGNIFICANT CHANGE UP (ref 13–44)
MAGNESIUM SERPL-MCNC: 2.3 MG/DL — SIGNIFICANT CHANGE UP (ref 1.6–2.6)
MCHC RBC-ENTMCNC: 28.8 PG — SIGNIFICANT CHANGE UP (ref 27–34)
MCHC RBC-ENTMCNC: 33.1 GM/DL — SIGNIFICANT CHANGE UP (ref 32–36)
MCV RBC AUTO: 87.1 FL — SIGNIFICANT CHANGE UP (ref 80–100)
MONOCYTES # BLD AUTO: 0.14 K/UL — SIGNIFICANT CHANGE UP (ref 0–0.9)
MONOCYTES NFR BLD AUTO: 1.8 % — LOW (ref 2–14)
NEUTROPHILS # BLD AUTO: 6.51 K/UL — SIGNIFICANT CHANGE UP (ref 1.8–7.4)
NEUTROPHILS NFR BLD AUTO: 83.2 % — HIGH (ref 43–77)
NRBC # BLD: 0 /100 WBCS — SIGNIFICANT CHANGE UP (ref 0–0)
PHOSPHATE SERPL-MCNC: 3.2 MG/DL — SIGNIFICANT CHANGE UP (ref 2.5–4.5)
PLATELET # BLD AUTO: 223 K/UL — SIGNIFICANT CHANGE UP (ref 150–400)
POTASSIUM SERPL-MCNC: 4 MMOL/L — SIGNIFICANT CHANGE UP (ref 3.5–5.3)
POTASSIUM SERPL-SCNC: 4 MMOL/L — SIGNIFICANT CHANGE UP (ref 3.5–5.3)
RBC # BLD: 4.65 M/UL — SIGNIFICANT CHANGE UP (ref 4.2–5.8)
RBC # FLD: 13.8 % — SIGNIFICANT CHANGE UP (ref 10.3–14.5)
SARS-COV-2 N GENE NPH QL NAA+PROBE: NOT DETECTED
SODIUM SERPL-SCNC: 139 MMOL/L — SIGNIFICANT CHANGE UP (ref 135–145)
WBC # BLD: 7.82 K/UL — SIGNIFICANT CHANGE UP (ref 3.8–10.5)
WBC # FLD AUTO: 7.82 K/UL — SIGNIFICANT CHANGE UP (ref 3.8–10.5)

## 2023-03-01 PROCEDURE — 82962 GLUCOSE BLOOD TEST: CPT

## 2023-03-01 PROCEDURE — 80048 BASIC METABOLIC PNL TOTAL CA: CPT

## 2023-03-01 PROCEDURE — 85025 COMPLETE CBC W/AUTO DIFF WBC: CPT

## 2023-03-01 PROCEDURE — 88307 TISSUE EXAM BY PATHOLOGIST: CPT

## 2023-03-01 PROCEDURE — C1889: CPT

## 2023-03-01 PROCEDURE — 36415 COLL VENOUS BLD VENIPUNCTURE: CPT

## 2023-03-01 PROCEDURE — S2900: CPT

## 2023-03-01 PROCEDURE — C9399: CPT

## 2023-03-01 PROCEDURE — 83735 ASSAY OF MAGNESIUM: CPT

## 2023-03-01 PROCEDURE — 84100 ASSAY OF PHOSPHORUS: CPT

## 2023-03-01 RX ORDER — METOPROLOL TARTRATE 50 MG
1 TABLET ORAL
Qty: 60 | Refills: 0
Start: 2023-03-01 | End: 2023-03-30

## 2023-03-01 RX ORDER — OMEPRAZOLE 10 MG/1
1 CAPSULE, DELAYED RELEASE ORAL
Qty: 30 | Refills: 0
Start: 2023-03-01 | End: 2023-03-30

## 2023-03-01 RX ORDER — ENOXAPARIN SODIUM 100 MG/ML
40 INJECTION SUBCUTANEOUS
Qty: 5.6 | Refills: 0
Start: 2023-03-01 | End: 2023-03-14

## 2023-03-01 RX ORDER — ONDANSETRON 8 MG/1
1 TABLET, FILM COATED ORAL
Qty: 28 | Refills: 0
Start: 2023-03-01 | End: 2023-03-07

## 2023-03-01 RX ORDER — METOPROLOL TARTRATE 50 MG
1 TABLET ORAL
Qty: 0 | Refills: 0 | DISCHARGE

## 2023-03-01 RX ORDER — ASPIRIN/CALCIUM CARB/MAGNESIUM 324 MG
1 TABLET ORAL
Qty: 30 | Refills: 0
Start: 2023-03-01 | End: 2023-03-30

## 2023-03-01 RX ORDER — ACETAMINOPHEN 500 MG
15 TABLET ORAL
Qty: 300 | Refills: 0
Start: 2023-03-01 | End: 2023-03-05

## 2023-03-01 RX ORDER — HYOSCYAMINE SULFATE 0.13 MG
1 TABLET ORAL
Qty: 28 | Refills: 0
Start: 2023-03-01 | End: 2023-03-07

## 2023-03-01 RX ADMIN — Medication 200 MILLIGRAM(S): at 05:45

## 2023-03-01 RX ADMIN — Medication 1000 MILLIGRAM(S): at 06:29

## 2023-03-01 RX ADMIN — Medication 25 MILLIGRAM(S): at 05:49

## 2023-03-01 RX ADMIN — Medication 15 MILLIGRAM(S): at 12:39

## 2023-03-01 RX ADMIN — Medication 2: at 10:21

## 2023-03-01 RX ADMIN — Medication 15 MILLIGRAM(S): at 00:05

## 2023-03-01 RX ADMIN — Medication 0.12 MILLIGRAM(S): at 01:18

## 2023-03-01 RX ADMIN — Medication 1000 MILLIGRAM(S): at 10:46

## 2023-03-01 RX ADMIN — Medication 0.12 MILLIGRAM(S): at 10:17

## 2023-03-01 RX ADMIN — Medication 15 MILLIGRAM(S): at 00:40

## 2023-03-01 RX ADMIN — ONDANSETRON 4 MILLIGRAM(S): 8 TABLET, FILM COATED ORAL at 05:50

## 2023-03-01 RX ADMIN — HEPARIN SODIUM 5000 UNIT(S): 5000 INJECTION INTRAVENOUS; SUBCUTANEOUS at 05:50

## 2023-03-01 RX ADMIN — Medication 0.12 MILLIGRAM(S): at 05:49

## 2023-03-01 RX ADMIN — Medication 400 MILLIGRAM(S): at 05:45

## 2023-03-01 RX ADMIN — Medication 15 MILLIGRAM(S): at 06:29

## 2023-03-01 RX ADMIN — ONDANSETRON 4 MILLIGRAM(S): 8 TABLET, FILM COATED ORAL at 12:38

## 2023-03-01 RX ADMIN — Medication 400 MILLIGRAM(S): at 10:16

## 2023-03-01 RX ADMIN — Medication 15 MILLIGRAM(S): at 05:50

## 2023-03-01 NOTE — DISCHARGE NOTE PROVIDER - NSDCCPTREATMENT_GEN_ALL_CORE_FT
PRINCIPAL PROCEDURE  Procedure: Robot-assisted sleeve gastrectomy  Findings and Treatment: Analgesia, bariariatric liquid diet, increase ambulation, dietary supplement follow up care, DVT propylaxis      SECONDARY PROCEDURE  Procedure: Repair, hiatal hernia, robot-assisted  Findings and Treatment:      PRINCIPAL PROCEDURE  Procedure: Robot-assisted sleeve gastrectomy  Findings and Treatment: Analgesia, bariariatric liquid diet, increase ambulation, dietary supplement follow up care, DVT propylaxis

## 2023-03-01 NOTE — DIETITIAN INITIAL EVALUATION ADULT - CALCULATED TO (CAL/KG)
Patient: Zonia Piper Date: 2021 9:43 AM   : 1935 Attending: Luigi Burrows MD   86 year old female               Subjective:    Interval History:  Patient is doing about the same.  Patient still has abdominal distention he and the some nausea.  Patient does not really have vomiting she the spits up some regurgitated fluid.  She does not have any abdominal pain.  She is having bowel movements.      Scheduled Medications:   • potassium CHLORIDE  40 mEq Oral Once   • amLODIPine  10 mg Oral Daily   • cholecalciferol  1,000 Units Oral Daily   • lisinopril  10 mg Oral Daily   • sodium chloride (PF)  2 mL Intracatheter 2 times per day   • Potassium Standard Replacement Protocol   Does not apply See Admin Instructions   • Magnesium Standard Replacement Protocol   Does not apply See Admin Instructions   • pantoprazole  40 mg Intravenous Daily       Continuous Infusions:   • sodium chloride 0.9% infusion 75 mL/hr at 214       As needed Medications: sodium chloride, sodium chloride, ondansetron, acetaminophen, HYDROcodone-acetaminophen, bisacodyl, morphine injection    Objective:    Vital Last Value 24-Hour Range   Temperature 97.2 °F (36.2 °C) Temp  Min: 97.2 °F (36.2 °C)  Max: 97.7 °F (36.5 °C)   Pulse 91 Pulse  Min: 75  Max: 91   Respiratory 20 Resp  Min: 20  Max: 20   Non-Invasive  Blood Pressure 126/58 (21 0757) BP  Min: 126/58  Max: 140/64   Pulse Oximetry 96 % SpO2  Min: 96 %  Max: 96 %   Arterial   Blood Pressure   No data recorded     Vital Today Admit   Weight 55.2 kg Weight: 50.5 kg   Height N/A Height: 4' 8\" (142.2 cm)   Body Mass Index N/A BMI (Calculated): 24.96     Intake/Output last 3 shifts:  I/O last 3 completed shifts:  In: 2429.6 [P.O.:50; I.V.:2379.6]  Out: 400 [Urine:400]    Intake/Output this shift:  No intake/output data recorded.    Npo Diet With Exceptions; Ice Chips, Medications    Last Stool Occurrence: 1 (21 0900)        Visit Vitals  /58 (BP  Location: LUE - Left upper extremity, Patient Position: Semi-Cabrera's)   Pulse 91   Temp 97.2 °F (36.2 °C) (Temporal)   Resp 20   Ht 4' 8\" (1.422 m)   Wt 55.2 kg   SpO2 96%   BMI 27.28 kg/m²       Exam:   Lungs:  CTAB  Abdomen:  Soft, distended, no tenderness, no peritonitis.  Extremities:  Within normal limits    Imaging:  Laboratory Results:  Recent Labs   Lab 06/22/21  0437 06/21/21  1313 06/21/21  0426 06/18/21  0608 06/18/21  0526 06/17/21  1841 06/17/21  1841   WBC 6.9  --  7.4  --   --    < > 8.9   HCT 33.4*  --  31.8*  --   --    < > 35.4*   HGB 11.6*  --  11.0*  --   --    < > 12.2     --  214  --   --    < > 239   BUN 6  --  6   < > 44*  --  50*   CREATININE 0.76  --  0.84   < > 1.40*  --  1.68*   SODIUM 141  --  143   < > 142  --  139   POTASSIUM 3.5 4.1 3.3*   < > 3.6  --  3.9   CHLORIDE 105  --  106   < > 104  --  101   AST  --   --   --   --  20  --  20   BILIRUBIN  --   --   --   --  0.9  --  0.8   CO2 28  --  32   < > 30  --  29   GLUCOSE 132*  --  118*   < > 86  --  118*   ALBUMIN  --   --   --   --  3.3*  --  3.6   LIPA  --   --   --   --   --   --  189    < > = values in this interval not displayed.         Assessment:    Patient partial small-bowel obstruction.  Patient is not improving.  Patient still distended.  He has partial bowel obstruction is not resolving with conservative measures.  I will put patient back on NPO and the NG tube suction.  Will do the acute abdominal series.  We will re-evaluate patient later today.  If patient does not improve may consider taking patient to the operating room for diagnostic laparoscopy possible laparotomy for the correction of her partial small-bowel obstruction.          Tejinder Paulino MD       [id number]

## 2023-03-01 NOTE — PROGRESS NOTE ADULT - ASSESSMENT
ASSESSMENT:57y Male s/p Robot-assisted sleeve gastrectomy, EGD for morbid obesity    PLAN  - jayla kitchen  - pain control  - oob/amb  - home meds per jayla pharm ASSESSMENT:57y Male s/p Robot-assisted sleeve gastrectomy, EGD for morbid obesity    PLAN  - jayla clears  - pain control  - oob/amb  - home meds per jayla pharm  -dispo home if bariatric criteria met

## 2023-03-01 NOTE — DIETITIAN INITIAL EVALUATION ADULT - ORAL INTAKE PTA/DIET HISTORY
Pt reports following a full liquid diet for 2 weeks PTA as instructed by outpatient RD. Pt reports having protein shakes, yogurt, blended soups. NKFA. Pt denies chewing/swallowing difficulty, nausea, vomiting, diarrhea, constipation.

## 2023-03-01 NOTE — DISCHARGE NOTE PROVIDER - NSDCFUSCHEDAPPT_GEN_ALL_CORE_FT
[facility]  GENSUR [address]  Scheduled Appointment: 03/17/2023    [facility] [address] [facility] [address]  Scheduled Appointment: 05/19/2023

## 2023-03-01 NOTE — DIETITIAN INITIAL EVALUATION ADULT - OTHER INFO
Pt with multiple previous wt loss attempts per chart and was unable to lose and maintain significant wt loss. Pre-surgical wt (H&P) noted as 292.9 pounds (2/16). Current wt of 290 pounds (2/28).

## 2023-03-01 NOTE — DISCHARGE NOTE PROVIDER - CARE PROVIDER_API CALL
[facility]  General Surgery  [address] [place] [address], [address]  [place], [place] [id number]  Phone: [phone]  Fax: [phone]  Follow Up Time:

## 2023-03-01 NOTE — PROGRESS NOTE ADULT - SUBJECTIVE AND OBJECTIVE BOX
Post Op Day#: 1    Subjective: "Doing well, no N.V no pain"    Objective: No cardio pulmonary acute events overnight denies sob, no c/p. Effective pain control, mo N/V. Continuous pulse oximetry at bedside functioning, v/s stable, afebrile. Labs within acceptable range.  Ambulated independently around the unit.  Tolerating bariatric clear liquid diet and voiding as expected.                                               Vital Signs Last 24 Hrs  T(C): 36.5 (01 Mar 2023 05:44), Max: 37.1 (01 Mar 2023 00:30)  T(F): 97.7 (01 Mar 2023 05:44), Max: 98.8 (01 Mar 2023 00:30)  HR: 58 (01 Mar 2023 05:44) (50 - 62)  BP: 137/75 (01 Mar 2023 05:44) (137/75 - 189/91)  BP(mean): 101 (28 Feb 2023 21:34) (101 - 131)  RR: 18 (01 Mar 2023 05:44) (15 - 18)  SpO2: 97% (01 Mar 2023 05:44) (93% - 100%)    Parameters below as of 01 Mar 2023 05:44  Patient On (Oxygen Delivery Method): room air                                                   I&O's Summary    28 Feb 2023 07:01  -  01 Mar 2023 07:00  --------------------------------------------------------  IN: 3050 mL / OUT: 1750 mL / NET: 1300 mL                                                                          13.4   7.82  )-----------( 223      ( 01 Mar 2023 07:14 )             40.5                                                 03-01    139  |  102  |  10  ----------------------------<  171<H>  4.0   |  19<L>  |  0.65    Ca    8.9      01 Mar 2023 07:49  Phos  3.2     03-01  Mg     2.3     03-01      acetaminophen   IVPB .. 1000 milliGRAM(s) IV Intermittent once  acetaminophen   IVPB .. 1000 milliGRAM(s) IV Intermittent once  dextrose 5%. 1000 milliLiter(s) IV Continuous <Continuous>  dextrose 50% Injectable 25 Gram(s) IV Push once  dextrose Oral Gel 15 Gram(s) Oral once PRN  glucagon  Injectable 1 milliGRAM(s) IntraMuscular once  heparin   Injectable 5000 Unit(s) SubCutaneous every 8 hours  hyoscyamine SL 0.125 milliGRAM(s) SubLingual every 4 hours  insulin lispro (ADMELOG) corrective regimen sliding scale   SubCutaneous Before meals and at bedtime  ketorolac   Injectable 15 milliGRAM(s) IV Push every 6 hours  lactated ringers. 1000 milliLiter(s) IV Continuous <Continuous>  metoprolol succinate ER 25 milliGRAM(s) Oral daily  ondansetron Injectable 4 milliGRAM(s) IV Push every 6 hours  pantoprazole  Injectable 40 milliGRAM(s) IV Push every 24 hours  sodium chloride 0.9% 1000 milliLiter(s) IV Continuous <Continuous>      Physical Exam:         Lungs:  clear breath sounds b/l       Heart:  Regular rate & rhythm       Abdomen:  Soft, non-distended.  Scopes sites clean, dry and intact. + bs, - flatus, no rebound or guarding       Skin:  intact, pannus w/o rash       Extremities: + pulses, no edema, no calf tenderness, negative perico's     VTE Extended Risk Assessment Scores             [facility]  VTE Predicted RISK Low:   ( <1% )     Assessment and Plan: This is a 57 year old male with DM, psoriatic arthritis, CONCEPCIÓN(on cpap), HLD,  Vitamin D deficiency, SVT (had ablation 10 years ago),  cardiac stent x 1  in 3/2020 , on ASA.                                   On 2/28,2023 he underwent Robotic-Assisted Lap Sleeve Gastrectomy and Hiatal Hernia Repair, POD # 1 stable    - Bariatric Clear diet today then protocol derived staged meal progression supervised by RD in outpatient setting  - HOLD ASA x 14 days.   - DVT - LMWH x  14 days, (patient education with teach back). GI prophylaxis, Incentive spirometry  - Ambulate as tolerated  - Procedure specific education including postop complications, medications and side effects, diet, vitamins and VTE prevention. Written materials given  - Medication reviewed and reconciled, Bariatric meds obtained from [facility] prior to d/c  - D/C home once Bariatric 8-Point d/c criteria met  - Follow up with Dr Vazquez  in 7-10 days, Dietitian and PMD in 30 days.      Tess De La Cruz, DNP, ANP  [phone]

## 2023-03-01 NOTE — PROGRESS NOTE ADULT - SUBJECTIVE AND OBJECTIVE BOX
TEAM Surgery Progress Note  Patient is a 57y old  Male who presents with a chief complaint of Gastric sleeve  and Hiatal hernia repair. (16 Feb 2023 15:22)      INTERVAL EVENTS: No acute events overnight.    SUBJECTIVE: Patient seen and examined at bedside with surgical team.    OBJECTIVE:    Vital Signs Last 24 Hrs  T(C): 36.3 (28 Feb 2023 22:30), Max: 36.9 (28 Feb 2023 11:40)  T(F): 97.4 (28 Feb 2023 22:30), Max: 98.4 (28 Feb 2023 11:40)  HR: 50 (28 Feb 2023 22:30) (50 - 62)  BP: 147/81 (28 Feb 2023 22:30) (147/81 - 189/91)  BP(mean): 101 (28 Feb 2023 21:34) (101 - 131)  RR: 18 (28 Feb 2023 22:30) (15 - 18)  SpO2: 96% (28 Feb 2023 22:30) (93% - 100%)    Parameters below as of 28 Feb 2023 22:30  Patient On (Oxygen Delivery Method): nasal cannula  O2 Flow (L/min): 2  I&O's Detail    28 Feb 2023 07:01  -  01 Mar 2023 00:29  --------------------------------------------------------  IN:    IV PiggyBack: 100 mL    Lactated Ringers: 450 mL    sodium chloride 0.9% w/ Additives: 500 mL  Total IN: 1050 mL    OUT:    Voided (mL): 850 mL  Total OUT: 850 mL    Total NET: 200 mL      MEDICATIONS  (STANDING):  acetaminophen   IVPB .. 1000 milliGRAM(s) IV Intermittent once  acetaminophen   IVPB .. 1000 milliGRAM(s) IV Intermittent once  acetaminophen   IVPB .. 1000 milliGRAM(s) IV Intermittent once  ceFAZolin   IVPB 3000 milliGRAM(s) IV Intermittent every 8 hours  dextrose 5%. 1000 milliLiter(s) (50 mL/Hr) IV Continuous <Continuous>  dextrose 50% Injectable 25 Gram(s) IV Push once  glucagon  Injectable 1 milliGRAM(s) IntraMuscular once  heparin   Injectable 5000 Unit(s) SubCutaneous every 8 hours  hyoscyamine SL 0.125 milliGRAM(s) SubLingual every 4 hours  insulin lispro (ADMELOG) corrective regimen sliding scale   SubCutaneous Before meals and at bedtime  ketorolac   Injectable 15 milliGRAM(s) IV Push every 6 hours  lactated ringers. 1000 milliLiter(s) (150 mL/Hr) IV Continuous <Continuous>  metoprolol succinate ER 25 milliGRAM(s) Oral daily  ondansetron Injectable 4 milliGRAM(s) IV Push every 6 hours  pantoprazole  Injectable 40 milliGRAM(s) IV Push every 24 hours  sodium chloride 0.9% 1000 milliLiter(s) (250 mL/Hr) IV Continuous <Continuous>    MEDICATIONS  (PRN):  dextrose Oral Gel 15 Gram(s) Oral once PRN Blood Glucose LESS THAN 70 milliGRAM(s)/deciliter      Physical Exam:  General: NAD, resting comfortably in bed  Neuro: A/O x 3, no focal deficits  Pulmonary: No respiratory distress, 2LNC  Abdominal: soft, obese, port sites cdi  Extremities: [facility]    LABS:                        13.5   7.76  )-----------( 203      ( 28 Feb 2023 17:26 )             41.2     02-28    140  |  101  |  8   ----------------------------<  135<H>  3.9   |  22  |  0.72    Ca    8.8      28 Feb 2023 17:26  Phos  3.3     02-28  Mg     1.8     02-28            ABO Interpretation: O (02-28-23 @ 12:32)      IMAGING:     TEAM Surgery Progress Note  Patient is a 57y old  Male who presents with a chief complaint of Gastric sleeve  and Hiatal hernia repair. (16 Feb 2023 15:22)      INTERVAL EVENTS: No acute events overnight.    SUBJECTIVE: Patient seen and examined at bedside with surgical team.    OBJECTIVE:    Vital Signs Last 24 Hrs  T(C): 36.3 (28 Feb 2023 22:30), Max: 36.9 (28 Feb 2023 11:40)  T(F): 97.4 (28 Feb 2023 22:30), Max: 98.4 (28 Feb 2023 11:40)  HR: 50 (28 Feb 2023 22:30) (50 - 62)  BP: 147/81 (28 Feb 2023 22:30) (147/81 - 189/91)  BP(mean): 101 (28 Feb 2023 21:34) (101 - 131)  RR: 18 (28 Feb 2023 22:30) (15 - 18)  SpO2: 96% (28 Feb 2023 22:30) (93% - 100%)    Parameters below as of 28 Feb 2023 22:30  Patient On (Oxygen Delivery Method): nasal cannula  O2 Flow (L/min): 2  I&O's Detail    28 Feb 2023 07:01  -  01 Mar 2023 00:29  ------------------------------------------------------  IN:    IV PiggyBack: 100 mL    Lactated Ringers: 450 mL    sodium chloride 0.9% w/ Additives: 500 mL  Total IN: 1050 mL    OUT:    Voided (mL): 850 mL  Total OUT: 850 mL    Total NET: 200 mL      MEDICATIONS  (STANDING):  acetaminophen   IVPB .. 1000 milliGRAM(s) IV Intermittent once  acetaminophen   IVPB .. 1000 milliGRAM(s) IV Intermittent once  acetaminophen   IVPB .. 1000 milliGRAM(s) IV Intermittent once  ceFAZolin   IVPB 3000 milliGRAM(s) IV Intermittent every 8 hours  dextrose 5%. 1000 milliLiter(s) (50 mL/Hr) IV Continuous <Continuous>  dextrose 50% Injectable 25 Gram(s) IV Push once  glucagon  Injectable 1 milliGRAM(s) IntraMuscular once  heparin   Injectable 5000 Unit(s) SubCutaneous every 8 hours  hyoscyamine SL 0.125 milliGRAM(s) SubLingual every 4 hours  insulin lispro (ADMELOG) corrective regimen sliding scale   SubCutaneous Before meals and at bedtime  ketorolac   Injectable 15 milliGRAM(s) IV Push every 6 hours  lactated ringers. 1000 milliLiter(s) (150 mL/Hr) IV Continuous <Continuous>  metoprolol succinate ER 25 milliGRAM(s) Oral daily  ondansetron Injectable 4 milliGRAM(s) IV Push every 6 hours  pantoprazole  Injectable 40 milliGRAM(s) IV Push every 24 hours  sodium chloride 0.9% 1000 milliLiter(s) (250 mL/Hr) IV Continuous <Continuous>    MEDICATIONS  (PRN):  dextrose Oral Gel 15 Gram(s) Oral once PRN Blood Glucose LESS THAN 70 milliGRAM(s)/deciliter      Physical Exam:  General: NAD, resting comfortably in bed  Neuro: A/O x 3, no focal deficits  Pulmonary: No respiratory distress, 2LNC  Abdominal: soft, obese, port sites cdi  Extremities: [facility]    LABS:                        13.5   7.76  )-----------( 203      ( 28 Feb 2023 17:26 )             41.2     02-28    140  |  101  |  8   ----------------------------<  135<H>  3.9   |  22  |  0.72    Ca    8.8      28 Feb 2023 17:26  Phos  3.3     02-28  Mg     1.8     02-28            ABO Interpretation: O (02-28-23 @ 12:32)      IMAGING:     TEAM Surgery Progress Note  Patient is a 57y old  Male who presents with a chief complaint of Gastric sleeve  and Hiatal hernia repair. (16 Feb 2023 15:22)      INTERVAL EVENTS: No acute events overnight.    SUBJECTIVE: Patient seen and examined at bedside with surgical team.    OBJECTIVE: Doing well. Tolerating liquids. -flatus, -BM. Ambulating.     Vital Signs Last 24 Hrs  T(C): 36.3 (28 Feb 2023 22:30), Max: 36.9 (28 Feb 2023 11:40)  T(F): 97.4 (28 Feb 2023 22:30), Max: 98.4 (28 Feb 2023 11:40)  HR: 50 (28 Feb 2023 22:30) (50 - 62)  BP: 147/81 (28 Feb 2023 22:30) (147/81 - 189/91)  BP(mean): 101 (28 Feb 2023 21:34) (101 - 131)  RR: 18 (28 Feb 2023 22:30) (15 - 18)  SpO2: 96% (28 Feb 2023 22:30) (93% - 100%)    Parameters below as of 28 Feb 2023 22:30  Patient On (Oxygen Delivery Method): nasal cannula  O2 Flow (L/min): 2  I&O's Detail    28 Feb 2023 07:01  -  01 Mar 2023 00:29  ------------------------------------------------------  IN:    IV PiggyBack: 100 mL    Lactated Ringers: 450 mL    sodium chloride 0.9% w/ Additives: 500 mL  Total IN: 1050 mL    OUT:    Voided (mL): 850 mL  Total OUT: 850 mL    Total NET: 200 mL      MEDICATIONS  (STANDING):  acetaminophen   IVPB .. 1000 milliGRAM(s) IV Intermittent once  acetaminophen   IVPB .. 1000 milliGRAM(s) IV Intermittent once  acetaminophen   IVPB .. 1000 milliGRAM(s) IV Intermittent once  ceFAZolin   IVPB 3000 milliGRAM(s) IV Intermittent every 8 hours  dextrose 5%. 1000 milliLiter(s) (50 mL/Hr) IV Continuous <Continuous>  dextrose 50% Injectable 25 Gram(s) IV Push once  glucagon  Injectable 1 milliGRAM(s) IntraMuscular once  heparin   Injectable 5000 Unit(s) SubCutaneous every 8 hours  hyoscyamine SL 0.125 milliGRAM(s) SubLingual every 4 hours  insulin lispro (ADMELOG) corrective regimen sliding scale   SubCutaneous Before meals and at bedtime  ketorolac   Injectable 15 milliGRAM(s) IV Push every 6 hours  lactated ringers. 1000 milliLiter(s) (150 mL/Hr) IV Continuous <Continuous>  metoprolol succinate ER 25 milliGRAM(s) Oral daily  ondansetron Injectable 4 milliGRAM(s) IV Push every 6 hours  pantoprazole  Injectable 40 milliGRAM(s) IV Push every 24 hours  sodium chloride 0.9% 1000 milliLiter(s) (250 mL/Hr) IV Continuous <Continuous>    MEDICATIONS  (PRN):  dextrose Oral Gel 15 Gram(s) Oral once PRN Blood Glucose LESS THAN 70 milliGRAM(s)/deciliter      Physical Exam:  General: NAD, resting comfortably in bed  Neuro: A/O x 3, no focal deficits  Pulmonary: No respiratory distress  Abdominal: soft, obese, port sites cdi  Extremities: [facility]    LABS:                        13.5   7.76  )-----------( 203      ( 28 Feb 2023 17:26 )             41.2     02-28    140  |  101  |  8   ----------------------------<  135<H>  3.9   |  22  |  0.72    Ca    8.8      28 Feb 2023 17:26  Phos  3.3     02-28  Mg     1.8     02-28            ABO Interpretation: O (02-28-23 @ 12:32)      IMAGING:

## 2023-03-01 NOTE — DISCHARGE NOTE PROVIDER - NSDCMRMEDTOKEN_GEN_ALL_CORE_FT
acetaminophen 500 mg/15 mL oral liquid: 15 milliliter(s) orally every 6 hours, As Needed   aspirin 81 mg oral delayed release tablet: 1 tab(s) orally once a day - switch to chewable tabs  atorvastatin 40 mg oral tablet: 1 tab(s) orally once a day - crush  Humira Pen 40 mg/0.8 mL subcutaneous kit: every two weeks  hyoscyamine 0.125 mg sublingual tablet: 1 tab(s) sublingual every 6 hours, As Needed MDD:4   Lovenox 40 mg/0.4 mL injectable solution: 40 milligram(s) subcutaneously once a day   metFORMIN 500 mg oral tablet: 2 tab(s) orally 2 times a day - crush  Metoprolol Tartrate 25 mg oral tablet: 1 tab(s) orally 2 times a day   omeprazole 40 mg oral delayed release capsule: 1 cap(s) orally once a day MDD:1  ondansetron 4 mg oral tablet, disintegratin tab(s) orally every 6 hours, As Needed   Vitamin D3 50 mcg (2000 intl units) oral tablet: 1 tab(s) orally once a day

## 2023-03-01 NOTE — DIETITIAN INITIAL EVALUATION ADULT - ENERGY INTAKE
Pt now S/P laparoscopic vertical sleeve gastrectomy. Pt denies N+V, sipping on bariatric clear liquids during RD visit. Pt with knowledge of bariatric full liquid diet and receptive to in depth review/reinforcement. Pt reports home stock of protein shakes with plans to purchase more. Pt reports plan to purchase the necessary vitamins/minerals in chewable/liquid/crushable form including a multivitamin with added elemental iron, calcium citrate with vitamin D, vitamin C, thiamine and sublingual B12. Pt was advised to take the multivitamin with elemental iron at least 2 hours apart from the calcium citrate with vitamin D for optimal absorption. Pt plans to schedule a follow up appointment with outpatient RD. Pt able to teach back all points discussed during interview.  Adequate (%)

## 2023-03-01 NOTE — DISCHARGE NOTE PROVIDER - HOSPITAL COURSE
This is a 57 year old obese male with DM, psoriatic arthritis, CONCEPCIÓN(on cpap), HLD,  Vitamin D deficiency, SVT (S/P ablation 10 years ago), cardiac stent x 1  in 3/2020 on ASA  no Plavix. On 2/28/2023 he underwent Robotic Assisted Laparoscopic Sleeve Gastrectomy and hiatal hernia repair. Bariatric ERAS protocol followed to include preoperative and perioperative use of DVT and SSI prophylaxis as well as multi-modal non-opioid analgesics. Patient tolerated the procedure well  extubated in the operating room then transferred to the PACU in stable condition. Once hemodynamically stable and effective pain control the patient was able to ambulate with assistance. Pt was also able to void within 4 hours following the procedure. The patient was later transferred to a bariatric unit and placed on bedside continuous pulse oximetry. Pain management included IV multi-modal non-opioid analgesia then transitioned to liquid as needed.  Bariatric clear liquid diet started the day of surgery.    On POD #1 patient remained stable with no acute events overnight, has effective  pain control. Denies nausea and vomiting. Patient is ambulating independently and voiding as expected. The rest of the hospital course was uneventful and there were no post-operative complications identified.  Patient met 8-Point Bariatric Surgery D/C criteria and subsequently cleared for discharge home on POD#1. LMWH extended VTE prophylaxis x 14 days ([facility] VTE Risk Stratification Risk low (<1% ). The patient will follow a protocol-derived staged meal progression supervised by the dietitian in the outpatient setting. Patient will follow-up with Dr. Vazquez in 7-10 days, medical doctor and dietitian in 30 days. [facility] appropriate [facility] from [facility] prior to discharge. Written discharge instructions explained and given to include postoperative complications, medications and side effect, diet and  VTE prevention. This is a 57 year old obese male with DM, psoriatic arthritis, CONCEPCIÓN(on cpap), HLD,  Vitamin D deficiency, SVT (S/P ablation 10 years ago), cardiac stent x 1  in 3/2020 on ASA  no Plavix. On 2/28/2023 he underwent Robotic Assisted Laparoscopic Sleeve Gastrectomy . Bariatric ERAS protocol followed to include preoperative and perioperative use of DVT and SSI prophylaxis as well as multi-modal non-opioid analgesics. Patient tolerated the procedure well  extubated in the operating room then transferred to the PACU in stable condition. Once hemodynamically stable and effective pain control the patient was able to ambulate with assistance. Pt was also able to void within 4 hours following the procedure. The patient was later transferred to a bariatric unit and placed on bedside continuous pulse oximetry. Pain management included IV multi-modal non-opioid analgesia then transitioned to liquid as needed.  Bariatric clear liquid diet started the day of surgery.    On POD #1 patient remained stable with no acute events overnight, has effective  pain control. Denies nausea and vomiting. Patient is ambulating independently and voiding as expected. The rest of the hospital course was uneventful and there were no post-operative complications identified.  Patient met 8-Point Bariatric Surgery D/C criteria and subsequently cleared for discharge home on POD#1. LMWH extended VTE prophylaxis x 14 days ([facility] VTE Risk Stratification Risk low (<1% ). The patient will follow a protocol-derived staged meal progression supervised by the dietitian in the outpatient setting. Patient will follow-up with Dr. Vazquez in 7-10 days, medical doctor and dietitian in 30 days. All appropriate prescriptions obtained from [facility] prior to discharge. Written discharge instructions explained and given to include postoperative complications, medications and side effect, diet and  VTE prevention.

## 2023-03-01 NOTE — PHARMACOTHERAPY INTERVENTION NOTE - COMMENTS
S/p sleeve gastrectomy on 2/28/2023.  Patient currently taking:     aspirin 81 mg oral delayed release tablet: 1 tab(s) orally once a day  atorvastatin 40 mg oral tablet: 1 tab(s) orally once a day  Humira Pen 40 mg/0.8 mL subcutaneous kit: every two weeks  metFORMIN 500 mg oral tablet: 2 tab(s) orally 2 times a day  Metoprolol Succinate ER 25 mg oral tablet, extended release: 1 tab(s) orally once a day  Vitamin D3 50 mcg (2000 intl units) oral tablet: 1 tab(s) orally once a day    Patient and spouse were instructed to use crushed, dissolvable, chewable, or liquid formulations of medications for 1 month after surgery. Patient was informed to take daily multivitamins post surgically. Patient reeducated on NSAID avoidance (ibuprofen, ASA, naproxen, aleve) as they increase risk of GI bleeding; may use APAP for mild pain otherwise contact prescriber for consult. Patient was informed on indications and directions for administration for acetaminophen liquid, zofran ODT, levsin SL, lovenox SQ and omeprazole DR. Pill  provided. Patient was instructed to take the medications as follows:     Continue aspirin 81mg chewable tabs daily - per primary team, hold x14 days while on lovenox SQ  Crush atorvastatin and metformin tabs  Continue vitamins/supplements in chewable/dissolvable forms   Switch metoprolol XL to metoprolol tartrate tablets BID - crush    Zachariah Hui PharmD, BCPS  [phone]  Available on Microsoft Teams

## 2023-03-01 NOTE — DIETITIAN INITIAL EVALUATION ADULT - REASON FOR ADMISSION
Morbid or severe obesity due to excess calories    Diaphragmatic hernia without obstruction and without gangrene    Chart reviewed, events noted. "This is a 57 year old male with DM, psoriatic arthritis, CONCEPCIÓN(on cpap), HLD,  Vitamin D deficiency, SVT (had ablation 10 years ago),  cardiac stent x 1  in 3/2020 , on ASA. On 2/28,2023 he underwent Robotic-Assisted Lap Sleeve Gastrectomy and Hiatal Hernia Repair, POD # 1 stable"

## 2023-03-01 NOTE — DIETITIAN INITIAL EVALUATION ADULT - NSICDXPASTMEDICALHX_GEN_ALL_CORE_FT
PAST MEDICAL HISTORY:  Arthritis     DM (diabetes mellitus)     Hiatal hernia     HLD (hyperlipidemia)     CONCEPCIÓN (obstructive sleep apnea) uses CPAP    Psoriasis     Psoriatic arthritis     SVT (supraventricular tachycardia) s/p ablation    Vitamin D deficiency

## 2023-03-01 NOTE — DIETITIAN INITIAL EVALUATION ADULT - NS FNS DIET ORDER
Diet, Clear Liquid:   Consistent Carbohydrate {Evening Snack} (CSTCHOSN)  Bariatric Clear Liquid (BARICLLIQ)     Special Instructions for Nursing:  Bariatric Clear Liquid,  start 6 hours postop if no nausea vomiting (02-28-23 @ 22:02)

## 2023-03-01 NOTE — DISCHARGE NOTE NURSING/CASE MANAGEMENT/SOCIAL WORK - PATIENT PORTAL LINK FT
You can access the FollowMyHealth Patient Portal offered by [facility] by registering at the following website: [email or web address]. By joining [employer]’s FollowMyHealth portal, you will also be able to view your health information using other applications (apps) compatible with our system.

## 2023-03-01 NOTE — DIETITIAN INITIAL EVALUATION ADULT - PERTINENT MEDS FT
MEDICATIONS  (STANDING):  acetaminophen   IVPB .. 1000 milliGRAM(s) IV Intermittent once  dextrose 5%. 1000 milliLiter(s) (50 mL/Hr) IV Continuous <Continuous>  dextrose 50% Injectable 25 Gram(s) IV Push once  glucagon  Injectable 1 milliGRAM(s) IntraMuscular once  heparin   Injectable 5000 Unit(s) SubCutaneous every 8 hours  hyoscyamine SL 0.125 milliGRAM(s) SubLingual every 4 hours  insulin lispro (ADMELOG) corrective regimen sliding scale   SubCutaneous Before meals and at bedtime  ketorolac   Injectable 15 milliGRAM(s) IV Push every 6 hours  lactated ringers. 1000 milliLiter(s) (150 mL/Hr) IV Continuous <Continuous>  metoprolol succinate ER 25 milliGRAM(s) Oral daily  ondansetron Injectable 4 milliGRAM(s) IV Push every 6 hours  pantoprazole  Injectable 40 milliGRAM(s) IV Push every 24 hours  sodium chloride 0.9% 1000 milliLiter(s) (250 mL/Hr) IV Continuous <Continuous>    MEDICATIONS  (PRN):  dextrose Oral Gel 15 Gram(s) Oral once PRN Blood Glucose LESS THAN 70 milliGRAM(s)/deciliter

## 2023-03-01 NOTE — DISCHARGE NOTE PROVIDER - NSDCCPCAREPLAN_GEN_ALL_CORE_FT
PRINCIPAL DISCHARGE DIAGNOSIS  Diagnosis: Severe obesity (BMI >= 40)  Assessment and Plan of Treatment:       SECONDARY DISCHARGE DIAGNOSES  Diagnosis: Hernia, diaphragmatic  Assessment and Plan of Treatment:

## 2023-03-01 NOTE — DIETITIAN INITIAL EVALUATION ADULT - PERTINENT LABORATORY DATA
03-01    139  |  102  |  10  ----------------------------<  171<H>  4.0   |  19<L>  |  0.65    Ca    8.9      01 Mar 2023 07:49  Phos  3.2     03-01  Mg     2.3     03-01    POCT Blood Glucose.: 161 mg/dL (03-01-23 @ 10:11)  A1C with Estimated Average Glucose Result: 8.9 % (02-16-23 @ 18:39)

## 2023-03-01 NOTE — PROGRESS NOTE ADULT - ATTENDING COMMENTS
POD1 s/p sleeve gastrectomy  Doing well, minimal pain, tolerating liquids PO.    Vitals normal  Abdomen soft  Inc c/d/i    Cont bariatric protocol  Encouraged ambulation  Discharge when criteria met    Kody Vazquez MD

## 2023-03-08 LAB — SURGICAL PATHOLOGY STUDY: SIGNIFICANT CHANGE UP

## 2023-03-10 NOTE — H&P PST ADULT - PROBLEM/PLAN-3
-- DO NOT REPLY / DO NOT REPLY ALL --  -- Message is from Engagement Center Operations (ECO) --    Offered Waitlist if Available for the Visit Type? Yes    Caller is requesting an appointment - at a sooner time than what was available.      Caller wants sooner appointment - offered other approved options    Reason for Visit: Establish care    Is the patient currently scheduled? Yes.  Appointment date:  4/21/2023    Preferred time to be seen: Thursday or Friday anytime sooner    Caller Information       Type Contact Phone/Fax    03/10/2023 01:36 PM CST Phone (Incoming) Conner Foreman (Self) [phone] (M)          Alternative phone number: N/A    Can a detailed message be left? Yes    Message Turnaround:     [place]:    Please give this turnaround time to the caller:   \"This message will be sent to [state Provider's name]. The clinical team will fulfill your request as soon as they review your message.\"   DISPLAY PLAN FREE TEXT

## 2023-03-17 ENCOUNTER — APPOINTMENT (OUTPATIENT)
Dept: SURGERY | Facility: CLINIC | Age: 58
End: 2023-03-17
Payer: COMMERCIAL

## 2023-03-20 ENCOUNTER — APPOINTMENT (OUTPATIENT)
Dept: SURGERY | Facility: CLINIC | Age: 58
End: 2023-03-20
Payer: COMMERCIAL

## 2023-03-20 VITALS
SYSTOLIC BLOOD PRESSURE: 144 MMHG | DIASTOLIC BLOOD PRESSURE: 74 MMHG | HEIGHT: 71 IN | OXYGEN SATURATION: 98 % | BODY MASS INDEX: 37.66 KG/M2 | HEART RATE: 54 BPM | RESPIRATION RATE: 18 BRPM | TEMPERATURE: 97 F | WEIGHT: 269 LBS

## 2023-03-20 PROCEDURE — 99024 POSTOP FOLLOW-UP VISIT: CPT

## 2023-03-20 NOTE — HISTORY OF PRESENT ILLNESS
[de-identified] : Wayne is a 57 year old male who presents for initial post op visit s/p robotic assisted laparoscopic sleeve gastrectomy and hiatal hernia repair on 2/28/23. \par Recovering comfortably without major complaints.  Minimal incisional pain.  No nausea.  Tolerating bariatric liquids, including protein shakes, without dysphagia or GERD.  Ambulating well.  No fevers or chills reported.

## 2023-03-20 NOTE — PHYSICAL EXAM
[Normal] : PERRL, EOMI, no conjunctival infection, anicteric [de-identified] : Normal respirations [de-identified] : Soft, nontender, nondistended.  Incisions well-healed.

## 2023-03-28 LAB
25(OH)D3 SERPL-MCNC: 43.9 NG/ML
ALBUMIN SERPL ELPH-MCNC: 4.4 G/DL
ALP BLD-CCNC: 56 U/L
ALT SERPL-CCNC: 14 U/L
ANION GAP SERPL CALC-SCNC: 12 MMOL/L
AST SERPL-CCNC: 22 U/L
BASOPHILS # BLD AUTO: 0.1 K/UL
BASOPHILS NFR BLD AUTO: 1.5 %
BILIRUB SERPL-MCNC: 0.5 MG/DL
BUN SERPL-MCNC: 12 MG/DL
CALCIUM SERPL-MCNC: 10 MG/DL
CHLORIDE SERPL-SCNC: 103 MMOL/L
CO2 SERPL-SCNC: 29 MMOL/L
CREAT SERPL-MCNC: 0.81 MG/DL
EGFR: 103 ML/MIN/1.73M2
EOSINOPHIL # BLD AUTO: 0.47 K/UL
EOSINOPHIL NFR BLD AUTO: 7 %
FERRITIN SERPL-MCNC: 273 NG/ML
FOLATE SERPL-MCNC: 7.8 NG/ML
GLUCOSE SERPL-MCNC: 110 MG/DL
HCT VFR BLD CALC: 41.2 %
HGB BLD-MCNC: 13.4 G/DL
IMM GRANULOCYTES NFR BLD AUTO: 0.1 %
IRON SATN MFR SERPL: 17 %
IRON SERPL-MCNC: 54 UG/DL
LYMPHOCYTES # BLD AUTO: 2.51 K/UL
LYMPHOCYTES NFR BLD AUTO: 37.4 %
MAN DIFF?: NORMAL
MCHC RBC-ENTMCNC: 28.6 PG
MCHC RBC-ENTMCNC: 32.5 GM/DL
MCV RBC AUTO: 88 FL
MONOCYTES # BLD AUTO: 0.64 K/UL
MONOCYTES NFR BLD AUTO: 9.5 %
NEUTROPHILS # BLD AUTO: 2.98 K/UL
NEUTROPHILS NFR BLD AUTO: 44.5 %
PLATELET # BLD AUTO: 256 K/UL
POTASSIUM SERPL-SCNC: 4.5 MMOL/L
PROT SERPL-MCNC: 7 G/DL
RBC # BLD: 4.68 M/UL
RBC # FLD: 15.1 %
SODIUM SERPL-SCNC: 143 MMOL/L
TIBC SERPL-MCNC: 312 UG/DL
UIBC SERPL-MCNC: 258 UG/DL
VIT B12 SERPL-MCNC: 574 PG/ML
WBC # FLD AUTO: 6.71 K/UL

## 2023-03-31 LAB — VIT B1 SERPL-MCNC: 121.6 NMOL/L

## 2023-04-03 ENCOUNTER — APPOINTMENT (OUTPATIENT)
Dept: SURGERY | Facility: CLINIC | Age: 58
End: 2023-04-03
Payer: COMMERCIAL

## 2023-04-03 VITALS
HEART RATE: 56 BPM | DIASTOLIC BLOOD PRESSURE: 73 MMHG | SYSTOLIC BLOOD PRESSURE: 120 MMHG | HEIGHT: 71 IN | BODY MASS INDEX: 36.96 KG/M2 | WEIGHT: 264 LBS | RESPIRATION RATE: 17 BRPM | OXYGEN SATURATION: 98 % | TEMPERATURE: 97.8 F

## 2023-04-03 DIAGNOSIS — E44.1 MILD PROTEIN-CALORIE MALNUTRITION: ICD-10-CM

## 2023-04-03 PROCEDURE — 99024 POSTOP FOLLOW-UP VISIT: CPT

## 2023-04-03 NOTE — PHYSICAL EXAM
[Normal] : PERRL, EOMI, no conjunctival infection, anicteric [de-identified] : Normal respirations [de-identified] : Soft, nontender, nondistended.  Incisions well-healed.

## 2023-04-03 NOTE — HISTORY OF PRESENT ILLNESS
[de-identified] : Wayne is a 57 year old male who presents for 1 month post op visit s/p robotic assisted laparoscopic sleeve gastrectomy and hiatal hernia repair on 2/28/23. \par He continues to do well postoperatively and is very happy with his weight loss.  He reports no dysphagia, reflux, or abdominal pain.  Continues to take multivitamins.  Walking for exercise.

## 2023-04-27 ENCOUNTER — NON-APPOINTMENT (OUTPATIENT)
Age: 58
End: 2023-04-27

## 2023-04-27 ENCOUNTER — EMERGENCY (EMERGENCY)
Facility: HOSPITAL | Age: 58
LOS: 1 days | Discharge: HOME | End: 2023-04-27
Attending: EMERGENCY MEDICINE
Payer: COMMERCIAL

## 2023-04-27 ENCOUNTER — TRANSCRIPTION ENCOUNTER (OUTPATIENT)
Age: 58
End: 2023-04-27

## 2023-04-27 VITALS
SYSTOLIC BLOOD PRESSURE: 115 MMHG | HEART RATE: 84 BPM | OXYGEN SATURATION: 97 % | DIASTOLIC BLOOD PRESSURE: 77 MMHG | RESPIRATION RATE: 16 BRPM | TEMPERATURE: 99 F

## 2023-04-27 VITALS
TEMPERATURE: 99 F | HEART RATE: 75 BPM | DIASTOLIC BLOOD PRESSURE: 72 MMHG | HEIGHT: 71 IN | SYSTOLIC BLOOD PRESSURE: 116 MMHG | WEIGHT: 248.9 LBS | RESPIRATION RATE: 16 BRPM | OXYGEN SATURATION: 99 %

## 2023-04-27 DIAGNOSIS — Q68.6 DISCOID MENISCUS: Chronic | ICD-10-CM

## 2023-04-27 DIAGNOSIS — Z98.890 OTHER SPECIFIED POSTPROCEDURAL STATES: Chronic | ICD-10-CM

## 2023-04-27 LAB
ALBUMIN SERPL ELPH-MCNC: 4.4 G/DL — SIGNIFICANT CHANGE UP (ref 3.3–5)
ALP SERPL-CCNC: 62 U/L — SIGNIFICANT CHANGE UP (ref 40–120)
ALT FLD-CCNC: 14 U/L — SIGNIFICANT CHANGE UP (ref 10–45)
ANION GAP SERPL CALC-SCNC: 14 MMOL/L — SIGNIFICANT CHANGE UP (ref 5–17)
APPEARANCE UR: ABNORMAL
APTT BLD: 34.6 SEC — SIGNIFICANT CHANGE UP (ref 27.5–35.5)
AST SERPL-CCNC: 17 U/L — SIGNIFICANT CHANGE UP (ref 10–40)
BACTERIA # UR AUTO: ABNORMAL
BASOPHILS # BLD AUTO: 0.05 K/UL — SIGNIFICANT CHANGE UP (ref 0–0.2)
BASOPHILS NFR BLD AUTO: 0.3 % — SIGNIFICANT CHANGE UP (ref 0–2)
BILIRUB SERPL-MCNC: 1.2 MG/DL — SIGNIFICANT CHANGE UP (ref 0.2–1.2)
BILIRUB UR-MCNC: ABNORMAL
BUN SERPL-MCNC: 8 MG/DL — SIGNIFICANT CHANGE UP (ref 7–23)
CALCIUM SERPL-MCNC: 9.8 MG/DL — SIGNIFICANT CHANGE UP (ref 8.4–10.5)
CHLORIDE SERPL-SCNC: 99 MMOL/L — SIGNIFICANT CHANGE UP (ref 96–108)
CO2 SERPL-SCNC: 25 MMOL/L — SIGNIFICANT CHANGE UP (ref 22–31)
COLOR SPEC: ABNORMAL
CREAT SERPL-MCNC: 0.83 MG/DL — SIGNIFICANT CHANGE UP (ref 0.5–1.3)
DIFF PNL FLD: ABNORMAL
EGFR: 103 ML/MIN/1.73M2 — SIGNIFICANT CHANGE UP
EOSINOPHIL # BLD AUTO: 0.01 K/UL — SIGNIFICANT CHANGE UP (ref 0–0.5)
EOSINOPHIL NFR BLD AUTO: 0.1 % — SIGNIFICANT CHANGE UP (ref 0–6)
EPI CELLS # UR: 0 /HPF — SIGNIFICANT CHANGE UP
GLUCOSE SERPL-MCNC: 173 MG/DL — HIGH (ref 70–99)
GLUCOSE UR QL: ABNORMAL
HCT VFR BLD CALC: 43.6 % — SIGNIFICANT CHANGE UP (ref 39–50)
HGB BLD-MCNC: 14.4 G/DL — SIGNIFICANT CHANGE UP (ref 13–17)
HYALINE CASTS # UR AUTO: 1 /LPF — SIGNIFICANT CHANGE UP (ref 0–2)
IMM GRANULOCYTES NFR BLD AUTO: 0.5 % — SIGNIFICANT CHANGE UP (ref 0–0.9)
INR BLD: 1.4 RATIO — HIGH (ref 0.88–1.16)
KETONES UR-MCNC: ABNORMAL
LEUKOCYTE ESTERASE UR-ACNC: ABNORMAL
LYMPHOCYTES # BLD AUTO: 1.35 K/UL — SIGNIFICANT CHANGE UP (ref 1–3.3)
LYMPHOCYTES # BLD AUTO: 8.7 % — LOW (ref 13–44)
MAGNESIUM SERPL-MCNC: 1.9 MG/DL — SIGNIFICANT CHANGE UP (ref 1.6–2.6)
MCHC RBC-ENTMCNC: 29.1 PG — SIGNIFICANT CHANGE UP (ref 27–34)
MCHC RBC-ENTMCNC: 33 GM/DL — SIGNIFICANT CHANGE UP (ref 32–36)
MCV RBC AUTO: 88.3 FL — SIGNIFICANT CHANGE UP (ref 80–100)
MONOCYTES # BLD AUTO: 1.1 K/UL — HIGH (ref 0–0.9)
MONOCYTES NFR BLD AUTO: 7.1 % — SIGNIFICANT CHANGE UP (ref 2–14)
NEUTROPHILS # BLD AUTO: 13 K/UL — HIGH (ref 1.8–7.4)
NEUTROPHILS NFR BLD AUTO: 83.3 % — HIGH (ref 43–77)
NITRITE UR-MCNC: NEGATIVE — SIGNIFICANT CHANGE UP
NRBC # BLD: 0 /100 WBCS — SIGNIFICANT CHANGE UP (ref 0–0)
PH UR: 6 — SIGNIFICANT CHANGE UP (ref 5–8)
PLATELET # BLD AUTO: 184 K/UL — SIGNIFICANT CHANGE UP (ref 150–400)
POTASSIUM SERPL-MCNC: 3.6 MMOL/L — SIGNIFICANT CHANGE UP (ref 3.5–5.3)
POTASSIUM SERPL-SCNC: 3.6 MMOL/L — SIGNIFICANT CHANGE UP (ref 3.5–5.3)
PROT SERPL-MCNC: 8 G/DL — SIGNIFICANT CHANGE UP (ref 6–8.3)
PROT UR-MCNC: ABNORMAL
PROTHROM AB SERPL-ACNC: 16.3 SEC — HIGH (ref 10.5–13.4)
RBC # BLD: 4.94 M/UL — SIGNIFICANT CHANGE UP (ref 4.2–5.8)
RBC # FLD: 14.8 % — HIGH (ref 10.3–14.5)
RBC CASTS # UR COMP ASSIST: 4 /HPF — SIGNIFICANT CHANGE UP (ref 0–4)
SODIUM SERPL-SCNC: 138 MMOL/L — SIGNIFICANT CHANGE UP (ref 135–145)
SP GR SPEC: 1.03 — HIGH (ref 1.01–1.02)
TROPONIN T, HIGH SENSITIVITY RESULT: 10 NG/L — SIGNIFICANT CHANGE UP (ref 0–51)
UROBILINOGEN FLD QL: ABNORMAL
WBC # BLD: 15.59 K/UL — HIGH (ref 3.8–10.5)
WBC # FLD AUTO: 15.59 K/UL — HIGH (ref 3.8–10.5)
WBC UR QL: 320 /HPF — HIGH (ref 0–5)

## 2023-04-27 PROCEDURE — 87077 CULTURE AEROBIC IDENTIFY: CPT

## 2023-04-27 PROCEDURE — 83735 ASSAY OF MAGNESIUM: CPT

## 2023-04-27 PROCEDURE — 85730 THROMBOPLASTIN TIME PARTIAL: CPT

## 2023-04-27 PROCEDURE — 99285 EMERGENCY DEPT VISIT HI MDM: CPT | Mod: 25

## 2023-04-27 PROCEDURE — 74177 CT ABD & PELVIS W/CONTRAST: CPT | Mod: 26,MA

## 2023-04-27 PROCEDURE — 74177 CT ABD & PELVIS W/CONTRAST: CPT | Mod: MA

## 2023-04-27 PROCEDURE — 80053 COMPREHEN METABOLIC PANEL: CPT

## 2023-04-27 PROCEDURE — 81001 URINALYSIS AUTO W/SCOPE: CPT

## 2023-04-27 PROCEDURE — 96374 THER/PROPH/DIAG INJ IV PUSH: CPT | Mod: XU

## 2023-04-27 PROCEDURE — 84484 ASSAY OF TROPONIN QUANT: CPT

## 2023-04-27 PROCEDURE — 99285 EMERGENCY DEPT VISIT HI MDM: CPT

## 2023-04-27 PROCEDURE — 93306 TTE W/DOPPLER COMPLETE: CPT

## 2023-04-27 PROCEDURE — 87186 SC STD MICRODIL/AGAR DIL: CPT

## 2023-04-27 PROCEDURE — 85025 COMPLETE CBC W/AUTO DIFF WBC: CPT

## 2023-04-27 PROCEDURE — 85610 PROTHROMBIN TIME: CPT

## 2023-04-27 PROCEDURE — 93005 ELECTROCARDIOGRAM TRACING: CPT

## 2023-04-27 PROCEDURE — 87086 URINE CULTURE/COLONY COUNT: CPT

## 2023-04-27 PROCEDURE — 71045 X-RAY EXAM CHEST 1 VIEW: CPT

## 2023-04-27 PROCEDURE — 93306 TTE W/DOPPLER COMPLETE: CPT | Mod: 26

## 2023-04-27 PROCEDURE — 71045 X-RAY EXAM CHEST 1 VIEW: CPT | Mod: 26

## 2023-04-27 PROCEDURE — 82962 GLUCOSE BLOOD TEST: CPT

## 2023-04-27 RX ORDER — ACETAMINOPHEN 500 MG
975 TABLET ORAL ONCE
Refills: 0 | Status: COMPLETED | OUTPATIENT
Start: 2023-04-27 | End: 2023-04-27

## 2023-04-27 RX ORDER — CEFTRIAXONE 500 MG/1
1000 INJECTION, POWDER, FOR SOLUTION INTRAMUSCULAR; INTRAVENOUS ONCE
Refills: 0 | Status: COMPLETED | OUTPATIENT
Start: 2023-04-27 | End: 2023-04-27

## 2023-04-27 RX ORDER — SODIUM CHLORIDE 9 MG/ML
1000 INJECTION INTRAMUSCULAR; INTRAVENOUS; SUBCUTANEOUS ONCE
Refills: 0 | Status: COMPLETED | OUTPATIENT
Start: 2023-04-27 | End: 2023-04-27

## 2023-04-27 RX ADMIN — CEFTRIAXONE 100 MILLIGRAM(S): 500 INJECTION, POWDER, FOR SOLUTION INTRAMUSCULAR; INTRAVENOUS at 11:40

## 2023-04-27 RX ADMIN — Medication 975 MILLIGRAM(S): at 18:27

## 2023-04-27 RX ADMIN — SODIUM CHLORIDE 1000 MILLILITER(S): 9 INJECTION INTRAMUSCULAR; INTRAVENOUS; SUBCUTANEOUS at 11:41

## 2023-04-27 NOTE — CONSULT NOTE ADULT - SUBJECTIVE AND OBJECTIVE BOX
BARIATRIC SURGERY CONSULT NOTE  --------------------------------------------------------------------------------------------    Patient is a 57y old  Male who presents with a chief complaint of syncope    HPI: 57M with PMHx CAD s/p stents 2020 on ASA, CONCEPCIÓN on CPAP, SVT s/p ablation, psoriatic arthritis, and obesity s/p robotic assisted sleeve gastrectomy 23 with Dr. Vazquez who presents now after syncopal event in shower this AM. Reports abdominal pain just prior to onset of syncope. Bariatric sx consulted i/s/o surgical hx and abdominal pain earlier in the day.     HDS/AF in ED. Labs notable for leukocytosis and + UA. CT A/P w PO/IV contrast w/o acute abdominal pathology, (-) obstruction.     Seen and examined at bedside. Reports patient has been tolerating regular diet post operatively without nausea/vomiting. Does note constipation post op, BM every 2-3 days which is new for him. Passing flatus consistently. Reports acute onset abdominal pain "hunger pain" this AM just prior to syncopal event. Denies HS, but does report LOC. Also reports dysuria recently.       PAST MEDICAL & SURGICAL HISTORY:  SVT (supraventricular tachycardia)  s/p ablation      DM (diabetes mellitus)      Arthritis      CONCEPCIÓN (obstructive sleep apnea)  uses CPAP      Vitamin D deficiency      Psoriasis      HLD (hyperlipidemia)      Hiatal hernia      Psoriatic arthritis      H/O prior ablation treatment  SVT      Discoid meniscus of left knee        FAMILY HISTORY:    [] Family history not pertinent as reviewed with the patient and family      ALLERGIES: No Known Allergies      CURRENT MEDICATIONS  MEDICATIONS (STANDING):   MEDICATIONS (PRN):  --------------------------------------------------------------------------------------------    Vitals:   T(C): 37.4 (23 @ 14:16), Max: 37.4 (23 @ 14:16)  HR: 77 (23 @ 14:16) (70 - 77)  BP: 136/73 (23 @ 14:16) (116/72 - 136/73)  RR: 14 (23 @ 14:16) (14 - 16)  SpO2: 100% (23 @ 14:16) (98% - 100%)  CAPILLARY BLOOD GLUCOSE      POCT Blood Glucose.: 152 mg/dL (2023 10:56)  POCT Blood Glucose.: 173 mg/dL (2023 10:12)    CAPILLARY BLOOD GLUCOSE      POCT Blood Glucose.: 152 mg/dL (2023 10:56)  POCT Blood Glucose.: 173 mg/dL (2023 10:12)      Height (cm): 180.3 ( @ 10:04)  Weight (kg): 112.9 ( @ 10:04)  BMI (kg/m2): 34.7 ( @ 10:04)  BSA (m2): 2.31 ( @ 10:04)    PHYSICAL EXAM:   General: Alert, NAD  Neuro: A+Ox3  HEENT: NC/AT, no asymmetry, no scleral icterus  Neck: Soft, supple  Cardio: RRR  Resp: Airway patent, unlabored breathing  Thorax: No chest wall tenderness  GI/Abd: Soft, NT/ND, no rebound/guarding, no masses palpated  Vascular: All 4 extremities warm  Skin: Intact, no breakdown  Musculoskeletal: All 4 extremities moving spontaneously, no limitations  --------------------------------------------------------------------------------------------    LABS  CBC ( @ 11:03)                              14.4                           15.59<H>  )----------------(  184        83.3<H>% Neutrophils, 8.7<L>% Lymphocytes, ANC: 13.00<H>                              43.6      BMP ( @ 11:03)             138     |  99      |  8     		Ca++ --      Ca 9.8                ---------------------------------( 173<H>		Mg 1.9                3.6     |  25      |  0.83  			Ph --        LFTs ( @ 11:03)      TPro 8.0 / Alb 4.4 / TBili 1.2 / DBili -- / AST 17 / ALT 14 / AlkPhos 62    Coags ( @ 11:03)  aPTT 34.6 / INR 1.40<H> / PT 16.3<H>          --------------------------------------------------------------------------------------------    MICROBIOLOGY  Urinalysis ( @ 11:03):     Color: Light Orange<!> / Appearance: Slightly Turbid<!> / S.028<H> / pH: 6.0 / Gluc: 100 mg/dL<!> / Ketones: Large<!> / Bili: Small<!> / Urobili: 3 mg/dL<!> / Protein :30 mg/dL<!> / Nitrites: Negative / Leuk.Est: Large<!> / RBC: 4 / WBC: 320<H> / Sq Epi:  / Non Sq Epi:  / Bacteria Moderate<!>         --------------------------------------------------------------------------------------------    IMAGING  < from: CT Abdomen and Pelvis w/ Oral Cont and w/ IV Cont (23 @ 15:58) >    ACC: [id number] EXAM:  CT ABDOMEN AND PELVIS OC IC   ORDERED BY:  KLARISSA HAUSER     PROCEDURE DATE:  2023          INTERPRETATION:  CLINICAL INFORMATION: Abdominal pain. Gastric sleeve.    COMPARISON: None.    CONTRAST/COMPLICATIONS:  IVContrast: Omnipaque 350  90 cc administered   10 cc discarded  Oral Contrast: Omnipaque 300  Complications: None reported at time of study completion    PROCEDURE:  CT of the Abdomen and Pelvis was performed.  Sagittal and coronal reformats were performed.    FINDINGS:  LOWER CHEST: Within normal limits.    LIVER: Within normal limits.  BILE DUCTS: Normal caliber.  GALLBLADDER: Within normal limits.  SPLEEN: Within normal limits.  PANCREAS: Within normal limits.  ADRENALS: Within normal limits.  KIDNEYS/URETERS: No hydronephrosis. Nonobstructing left renal calculi,   measuring up to 0.5 cm.    BLADDER: Within normal limits.  REPRODUCTIVE ORGANS: Prostate is enlarged.    BOWEL: Gastric sleeve. No bowel obstruction. Appendix is normal. Colonic   diverticulosis.  PERITONEUM: No ascites.  VESSELS: Atherosclerotic changes.  RETROPERITONEUM/LYMPH NODES: No lymphadenopathy.  ABDOMINAL WALL: Small fat-containing umbilical hernia.  BONES: Degenerative changes.    IMPRESSION:  No acute intra-abdominal pathology.    --- End of Report ---            FERMÍN SORIANO MD; Attending Radiologist  This document has been electronically signed. 2023  4:28PM    < end of copied text >  
DATE OF SERVICE: 04-27-23 @ 23:24    CHIEF COMPLAINT:Patient is a 57y old  Male who presents with a chief complaint of     HISTORY OF PRESENT ILLNESS:HPI: 58 yo M with a PMH Of DM, HLD, SVT sp ablation, sp gastric sleeve x 2 mo ago w/ Dr. Vazquez presents sp syncopal episode this morning. Pt states he was taking a shower and started to feel severe "hunger pains" in his stomach. Felt a hot sensation over his body and noticed he felt lightheaded so sat down on the bench in the shower and then passed out. Found himself on the floor. Over past day also has been having urinary frequency. Has never happened before. Called his PMDs office, follows with Dr. Richards and advised he come to ED. Of note, pt has had urinary complaints since yesterday, has been voiding every 15 mins but felt like was not completely emptying. Denies nausea, vomiting, fever, chills, chest pain, SOB, palps, abd distension, diarrhea, blood in stool, headache, dizziness, recent illness.      PAST MEDICAL & SURGICAL HISTORY:  SVT (supraventricular tachycardia)  s/p ablation      DM (diabetes mellitus)      Arthritis      CONCEPCIÓN (obstructive sleep apnea)  uses CPAP      Vitamin D deficiency      Psoriasis      HLD (hyperlipidemia)      Hiatal hernia      Psoriatic arthritis      H/O prior ablation treatment  SVT      Discoid meniscus of left knee          MEDICATIONS:      FAMILY HISTORY:      Non-contributory    SOCIAL HISTORY:    [ ] not a smoker    Allergies    No Known Allergies    Intolerances    	    REVIEW OF SYSTEMS:  CONSTITUTIONAL: No fever  EYES: No eye pain, visual disturbances, or discharge  ENMT:  No difficulty hearing, tinnitus  NECK: No pain or stiffness  RESPIRATORY: No cough, wheezing,  CARDIOVASCULAR: No chest pain, palpitations, passing out, dizziness, or leg swelling  GASTROINTESTINAL:  No nausea, vomiting, diarrhea or constipation. No melena.  GENITOURINARY: No dysuria, hematuria  NEUROLOGICAL: No stroke like symptoms  SKIN: No burning or lesions   ENDOCRINE: No heat or cold intolerance  MUSCULOSKELETAL: No joint pain or swelling  PSYCHIATRIC: No  anxiety, mood swings  HEME/LYMPH: No bleeding gums  ALLERGY AND IMMUNOLOGIC: No hives or eczema	    All other ROS negative    PHYSICAL EXAM:  T(C): 37.4 (04-27-23 @ 18:36), Max: 37.4 (04-27-23 @ 14:16)  HR: 84 (04-27-23 @ 18:36) (70 - 84)  BP: 115/77 (04-27-23 @ 18:36) (115/77 - 136/73)  RR: 16 (04-27-23 @ 18:36) (14 - 16)  SpO2: 97% (04-27-23 @ 18:36) (97% - 100%)  Wt(kg): --  I&O's Summary      Appearance: Normal	  HEENT:   Normal oral mucosa, EOMI	  Cardiovascular:  S1 S2, No JVD,    Respiratory: Lungs clear to auscultation	  Psychiatry: Alert  Gastrointestinal:  Soft, Non-tender, + BS	  Skin: No rashes   Neurologic: Non-focal  Extremities:  No edema  Vascular: Peripheral pulses palpable    	    	  	  CARDIAC MARKERS:  Labs personally reviewed by me                                  14.4   15.59 )-----------( 184      ( 27 Apr 2023 11:03 )             43.6     04-27    138  |  99  |  8   ----------------------------<  173<H>  3.6   |  25  |  0.83    Ca    9.8      27 Apr 2023 11:03  Mg     1.9     04-27    TPro  8.0  /  Alb  4.4  /  TBili  1.2  /  DBili  x   /  AST  17  /  ALT  14  /  AlkPhos  62  04-27          EKG: Personally reviewed by me - NSR  Radiology: Personally reviewed by me - CXR clear lungs        TTE -- 1. The left ventricular systolic function is normal with an ejection fraction of 68 % by Santiago's method of disks.   2. Normal right ventricular cavity size, normal wall thickness and normal systolic function.   3. Trace mitral regurgitation.   4. Global longitudinal strain is -21.6 % (normal < -18%). GLS was assessed on [employer] echo machine with a heart rate of 74 bpm and a blood pressure of 116/72 mmHg.   5. No evidence of aortic regurgitation.   6. No prior echocardiogram is available for comparison.      Assessment /Plan:   58 yo M with a PMH Of DM, HLD, SVT sp ablation, sp gastric sleeve x 2 mo ago w/ Dr. Vazquez presents sp syncopal episode this morning. Pt states he was taking a shower and started to feel severe "hunger pains" in his stomach. Felt a hot sensation over his body and noticed he felt lightheaded so sat down on the bench in the shower and then passed out. Found himself on the floor. Over past day also has been having urinary frequency. Has never happened before. Called his PMDs office, follows with Dr. Richards and advised he come to ED. Of note, pt has had urinary complaints since yesterday, has been voiding every 15 mins but felt like was not completely emptying. Denies nausea, vomiting, fever, chills, chest pain, SOB, palps, abd distension, diarrhea, blood in stool, headache, dizziness, recent illness.    1. Syncope - appears vasovagal 2/2 UTI and abd pain  - echo, tele and discharge if unremarkable     2. HLD - c/w statin    3. hx of SVT s/p ablation - doubt syncope is arrhythmogenic in origin    4. Hx of gastric sleeve - bariatric surgery recs appreciated           Differential diagnosis and plan of care discussed with patient after the evaluation. Counseling on diet, nutritional counseling, weight management, exercise and medication compliance was done.   Advanced care planning/advanced directives discussed with patient/family. DNR status including forceful chest compressions to attempt to restart the heart, ventilator support/artificial breathing, electric shock, artificial nutrition, health care proxy, Molst form all discussed with pt. Pt wishes to consider. More than fifteen minutes spent on discussing advanced directives.          Arley Fong DO [facility]  Cardiovascular Medicine  [address], [address]  Office [phone]  Available via call/text on [employer] Teams

## 2023-04-27 NOTE — ED PROVIDER NOTE - PATIENT PORTAL LINK FT
You can access the FollowMyHealth Patient Portal offered by [facility] by registering at the following website: [email or web address]. By joining [facility]’s FollowMyHealth portal, you will also be able to view your health information using other applications (apps) compatible with our system.

## 2023-04-27 NOTE — ED PROVIDER NOTE - PROGRESS NOTE DETAILS
Pt tolerated PO. No abd pain at present. No reported events on tele. Cleared by surgery and Cards. Can f/u outpt. Copy of results given. Patient stable for discharge.  Follow up instructions given, return to ED precautions reviewed. Importance of follow up emphasized, patient verbalized understanding.  All questions answered. Gabbi Calvo PA-C

## 2023-04-27 NOTE — CHART NOTE - NSCHARTNOTEFT_GEN_A_CORE
57yM hx of sleeve gastrectomy approx 2 months ago, presents to ED with syncopal episode.  Patient had been having dysuria and urinary frequency with difficulty passing stream x 2 days.  Reports no fever or chills.  No nausea. Today, experienced acute onset, severe abdominal pain that felt like "hunger pain" right before syncopizing.  Currently without pain, nausea, or dizziness.    Vital Signs Last 24 Hrs  T(C): 37.3 (27 Apr 2023 11:01), Max: 37.3 (27 Apr 2023 11:00)  T(F): 99.2 (27 Apr 2023 11:01), Max: 99.2 (27 Apr 2023 11:00)  HR: 70 (27 Apr 2023 11:01) (70 - 75)  BP: 127/74 (27 Apr 2023 11:01) (116/72 - 127/-)  BP(mean): --  RR: 16 (27 Apr 2023 11:01) (16 - 16)  SpO2: 98% (27 Apr 2023 11:01) (98% - 99%)    Parameters below as of 27 Apr 2023 11:01  Patient On (Oxygen Delivery Method): room air    A+Ox3, NAD  Abdomen soft, nontender, nondistended    Impression:  Syncopal episode w ddx including dehydration, infection, less likely GI cause.    Would obtain CT A/P with PO/IV contrast  labs  UA    Give IVF with MVI  Will f/u CT    DIscussed with ED team.    Kody Vazquez MD
Sent in by Dr Alex Richards for syncope. Please update us prior to disposition

## 2023-04-27 NOTE — ED PROVIDER NOTE - NS ED ATTENDING STATEMENT MOD
This was a shared visit with the SANDEE. I reviewed and verified the documentation and independently performed the documented:

## 2023-04-27 NOTE — ED PROVIDER NOTE - OBJECTIVE STATEMENT
58 yo M with a PMH Of DM, HLD, SVT sp ablation, sp gastric sleeve x 2 mo ago w/ Dr. Vazquez presents sp syncopal episode this morning. Pt states he was taking a shower and started to feel severe "hunger pains" in his stomach. Felt a hot sensation over his body and noticed he felt lightheaded so sat down on the bench in the shower and then passed out. Found himself on the floor. Has never happened before. Called his PMDs office, follows with Dr. Richards and advised he come to ED. Of note, pt has had urinary complaints since yesterday, has been voiding every 15 mins but felt like was not completely emptying. Denies nausea, vomiting, fever, chills, chest pain, SOB, palps, abd distension, diarrhea, blood in stool, headache, dizziness, recent illness.

## 2023-04-27 NOTE — ED ADULT TRIAGE NOTE - CHIEF COMPLAINT QUOTE
syncope episode today while in the shower.  Negative head strike, on daily ASA. Pt reports feeling generalized weakness and having dysuria for the last couple of days.

## 2023-04-27 NOTE — ED PROVIDER NOTE - CARE PROVIDER_API CALL
Kody Vazquez  General Surgery  [address], [address]  [place], [place] [address]  Phone: [phone]  Fax: [phone]  Follow Up Time:     Arley Fong (DO)  Cardiovascular Disease; Internal Medicine; Nuclear Cardiology  [address], [address]  [place] [address]  Phone: [phone]  Fax: [phone]  Follow Up Time:

## 2023-04-27 NOTE — ED PROVIDER NOTE - CLINICAL SUMMARY MEDICAL DECISION MAKING FREE TEXT BOX
Madison: 57 year old male with gastric sleeve 2 months ago here with syncope this am in the shower. Patient sates he was having abdominal pain and sat down in shower and passed out.  Patient also c/o dysuria since yesterday. no penile discharge. will get labs, ivf, ua, ct a/p, ekg, surgery consult, reassess.

## 2023-04-27 NOTE — ED ADULT NURSE NOTE - CHIEF COMPLAINT QUOTE
syncope episode today while in the shower.  Negative head strike, on daily ASA. Pt reports feeling generalized weakness and having dysuria for the last couple of days. 26-Mar-2018 22:53

## 2023-04-27 NOTE — ED ADULT NURSE NOTE - OBJECTIVE STATEMENT
Patient is a 56 yo male A&Ox4 PMH catheter ablation, gastric bypass, type 2 diabetes presenting to the ED form home complaining of dysuria and syncope. Patient states that x2 days has been experiencing dysuria. Patient states that this AM patient was in the shower and experienced a syncopal episode without LOC or hitting head. Patient stated he felt "hunger pains" prior to the syncopal episode. Patient denies SOB, fever/chills, headache, blurred vision, weakness, chest pain, abd pain, n/v/diarrhea. On exam, pt lung sounds clear bilat apices and bases, abd soft, nontender, nondistended, PERRLA, +3 strength bilat upper and lower extremities, sensation intact, steady gait.

## 2023-04-27 NOTE — ED PROVIDER NOTE - NSFOLLOWUPINSTRUCTIONS_ED_ALL_ED_FT
Please make sure to follow up with your primary care doctor within 1-2 days and with the surgery and cardiology specialist as needed. The information for follow up can be found below. Bring a copy of all of your results with you to your follow up appointments.   Return to the ER as discussed if you develop any new or worsening symptoms.      Kody Vazquez  General Surgery  [address], [address]  [place], [place] [id number]  Phone: [phone]      Arley Fong (DO)  Cardiovascular Disease; Internal Medicine; Nuclear Cardiology  [address], [address]  [place] [address]  Phone: [phone]

## 2023-04-27 NOTE — CONSULT NOTE ADULT - ASSESSMENT
57M with PMHx CAD s/p stents 2020 on ASA, CONCEPCIÓN on CPAP, SVT s/p ablation, psoriatic arthritis, and obesity s/p robotic assisted sleeve gastrectomy 2/28/23 with Dr. Vazquez who presents now after syncopal event in shower this AM. Reports abdominal pain just prior to onset of syncope. Bariatric sx consulted i/s/o surgical hx and abdominal pain earlier in the day. Abdominal exam benign and CT w/o acute findings.     Plan:   -No acute surgical intervention or indication for surgical admission  -Dispo per ED     Discussed with Attending Surgeon Dr. Taylor Edgar MD PGY2  Green Team   [id number]

## 2023-04-28 ENCOUNTER — NON-APPOINTMENT (OUTPATIENT)
Age: 58
End: 2023-04-28

## 2023-04-28 ENCOUNTER — TRANSCRIPTION ENCOUNTER (OUTPATIENT)
Age: 58
End: 2023-04-28

## 2023-04-28 RX ORDER — CEFPODOXIME PROXETIL 100 MG
1 TABLET ORAL
Qty: 14 | Refills: 0
Start: 2023-04-28 | End: 2023-05-04

## 2023-04-28 NOTE — ED POST DISCHARGE NOTE - ADDITIONAL DOCUMENTATION
4/28/23: Idalia SY- received call from Dr. Fong, this patient contacted his Internist with concern that antibiotics never sent to pharmacy. reviewed chart and with discussion with Dr. Fong, pt had urinary symptoms, +UA and given ceftriaxone. syncope was associated with UTI.  Cefpodoxime sent to pharmacy. let Dr. Fong know. 4/28/23: Idalia SY- received call from Dr. Fong, this patient contacted his Internist with concern that antibiotics never sent to pharmacy. reviewed chart and with discussion with Dr. Fong, pt had urinary symptoms, +UA and given ceftriaxone. Cefpodoxime sent to pharmacy. let Dr. Fong know.

## 2023-04-28 NOTE — ED POST DISCHARGE NOTE - RESULT SUMMARY
ucx final >100k citrobacter, DCd on cefpodoxime. sensitive to cephalosporins. appropriate care given - Godfrey Galvan PA-C

## 2023-04-30 LAB
-  AMIKACIN: SIGNIFICANT CHANGE UP
-  AMOXICILLIN/CLAVULANIC ACID: SIGNIFICANT CHANGE UP
-  AMPICILLIN/SULBACTAM: SIGNIFICANT CHANGE UP
-  AMPICILLIN: SIGNIFICANT CHANGE UP
-  AZTREONAM: SIGNIFICANT CHANGE UP
-  CEFAZOLIN: SIGNIFICANT CHANGE UP
-  CEFEPIME: SIGNIFICANT CHANGE UP
-  CEFOXITIN: SIGNIFICANT CHANGE UP
-  CEFTRIAXONE: SIGNIFICANT CHANGE UP
-  CIPROFLOXACIN: SIGNIFICANT CHANGE UP
-  ERTAPENEM: SIGNIFICANT CHANGE UP
-  GENTAMICIN: SIGNIFICANT CHANGE UP
-  IMIPENEM: SIGNIFICANT CHANGE UP
-  LEVOFLOXACIN: SIGNIFICANT CHANGE UP
-  MEROPENEM: SIGNIFICANT CHANGE UP
-  NITROFURANTOIN: SIGNIFICANT CHANGE UP
-  PIPERACILLIN/TAZOBACTAM: SIGNIFICANT CHANGE UP
-  TOBRAMYCIN: SIGNIFICANT CHANGE UP
-  TRIMETHOPRIM/SULFAMETHOXAZOLE: SIGNIFICANT CHANGE UP
CULTURE RESULTS: SIGNIFICANT CHANGE UP
METHOD TYPE: SIGNIFICANT CHANGE UP
ORGANISM # SPEC MICROSCOPIC CNT: SIGNIFICANT CHANGE UP
ORGANISM # SPEC MICROSCOPIC CNT: SIGNIFICANT CHANGE UP
SPECIMEN SOURCE: SIGNIFICANT CHANGE UP

## 2023-05-17 ENCOUNTER — APPOINTMENT (OUTPATIENT)
Dept: INTERNAL MEDICINE | Facility: CLINIC | Age: 58
End: 2023-05-17
Payer: COMMERCIAL

## 2023-05-17 ENCOUNTER — NON-APPOINTMENT (OUTPATIENT)
Age: 58
End: 2023-05-17

## 2023-05-17 ENCOUNTER — TRANSCRIPTION ENCOUNTER (OUTPATIENT)
Age: 58
End: 2023-05-17

## 2023-05-17 VITALS
HEIGHT: 71 IN | BODY MASS INDEX: 34.16 KG/M2 | DIASTOLIC BLOOD PRESSURE: 80 MMHG | WEIGHT: 244 LBS | TEMPERATURE: 98.6 F | OXYGEN SATURATION: 98 % | SYSTOLIC BLOOD PRESSURE: 110 MMHG | RESPIRATION RATE: 16 BRPM | HEART RATE: 85 BPM

## 2023-05-17 DIAGNOSIS — R94.31 ABNORMAL ELECTROCARDIOGRAM [ECG] [EKG]: ICD-10-CM

## 2023-05-17 DIAGNOSIS — I48.91 UNSPECIFIED ATRIAL FIBRILLATION: ICD-10-CM

## 2023-05-17 DIAGNOSIS — G47.33 OBSTRUCTIVE SLEEP APNEA (ADULT) (PEDIATRIC): ICD-10-CM

## 2023-05-17 DIAGNOSIS — L40.50 ARTHROPATHIC PSORIASIS, UNSPECIFIED: ICD-10-CM

## 2023-05-17 DIAGNOSIS — Z13.228 ENCOUNTER FOR SCREENING FOR OTHER METABOLIC DISORDERS: ICD-10-CM

## 2023-05-17 DIAGNOSIS — R51.9 HEADACHE, UNSPECIFIED: ICD-10-CM

## 2023-05-17 PROCEDURE — 93000 ELECTROCARDIOGRAM COMPLETE: CPT

## 2023-05-17 PROCEDURE — 99214 OFFICE O/P EST MOD 30 MIN: CPT | Mod: 25

## 2023-05-17 RX ORDER — APIXABAN 5 MG/1
5 TABLET, FILM COATED ORAL
Qty: 180 | Refills: 1 | Status: ACTIVE | COMMUNITY
Start: 2023-05-17 | End: 1900-01-01

## 2023-05-18 PROBLEM — G47.33 OBSTRUCTIVE SLEEP APNEA: Status: ACTIVE | Noted: 2019-10-10

## 2023-05-18 PROBLEM — L40.50 PSORIATIC ARTHRITIS: Status: ACTIVE | Noted: 2019-11-19

## 2023-05-18 PROBLEM — R51.9 FACIAL PAIN: Status: ACTIVE | Noted: 2023-05-17

## 2023-05-18 PROBLEM — I48.91 ATRIAL FIBRILLATION: Status: ACTIVE | Noted: 2023-05-17

## 2023-05-18 PROBLEM — Z13.228 SCREENING FOR METABOLIC DISORDER: Status: ACTIVE | Noted: 2019-10-10

## 2023-05-18 PROBLEM — R94.31 ABNORMAL EKG: Status: ACTIVE | Noted: 2019-10-10

## 2023-05-18 LAB
ALBUMIN SERPL ELPH-MCNC: 4.5 G/DL
ALP BLD-CCNC: 48 U/L
ALT SERPL-CCNC: 10 U/L
ANION GAP SERPL CALC-SCNC: 18 MMOL/L
AST SERPL-CCNC: 19 U/L
BASOPHILS # BLD AUTO: 0.11 K/UL
BASOPHILS NFR BLD AUTO: 1 %
BILIRUB SERPL-MCNC: 0.7 MG/DL
BUN SERPL-MCNC: 15 MG/DL
CALCIUM SERPL-MCNC: 10.5 MG/DL
CHLORIDE SERPL-SCNC: 100 MMOL/L
CO2 SERPL-SCNC: 23 MMOL/L
CREAT SERPL-MCNC: 0.87 MG/DL
EGFR: 101 ML/MIN/1.73M2
EOSINOPHIL # BLD AUTO: 0.09 K/UL
EOSINOPHIL NFR BLD AUTO: 0.8 %
GLUCOSE SERPL-MCNC: 118 MG/DL
HCT VFR BLD CALC: 49.3 %
HGB BLD-MCNC: 15.7 G/DL
IMM GRANULOCYTES NFR BLD AUTO: 0.2 %
LYMPHOCYTES # BLD AUTO: 4.23 K/UL
LYMPHOCYTES NFR BLD AUTO: 39.8 %
MAGNESIUM SERPL-MCNC: 2 MG/DL
MAN DIFF?: NORMAL
MCHC RBC-ENTMCNC: 28.9 PG
MCHC RBC-ENTMCNC: 31.8 GM/DL
MCV RBC AUTO: 90.6 FL
MONOCYTES # BLD AUTO: 0.71 K/UL
MONOCYTES NFR BLD AUTO: 6.7 %
NEUTROPHILS # BLD AUTO: 5.47 K/UL
NEUTROPHILS NFR BLD AUTO: 51.5 %
PLATELET # BLD AUTO: 291 K/UL
POTASSIUM SERPL-SCNC: 4.5 MMOL/L
PROT SERPL-MCNC: 7.9 G/DL
RBC # BLD: 5.44 M/UL
RBC # FLD: 14.7 %
SODIUM SERPL-SCNC: 141 MMOL/L
T4 FREE SERPL-MCNC: 1 NG/DL
TSH SERPL-ACNC: 2.74 UIU/ML
WBC # FLD AUTO: 10.63 K/UL

## 2023-05-18 NOTE — PHYSICAL EXAM
[No Acute Distress] : no acute distress [Well Nourished] : well nourished [Well Developed] : well developed [Well-Appearing] : well-appearing [Normal Sclera/Conjunctiva] : normal sclera/conjunctiva [Normal Outer Ear/Nose] : the outer ears and nose were normal in appearance [Normal Oropharynx] : the oropharynx was normal [No JVD] : no jugular venous distention [No Lymphadenopathy] : no lymphadenopathy [Supple] : supple [No Respiratory Distress] : no respiratory distress  [No Accessory Muscle Use] : no accessory muscle use [Clear to Auscultation] : lungs were clear to auscultation bilaterally [Normal Rate] : normal rate  [Normal S1, S2] : normal S1 and S2 [No Murmur] : no murmur heard [No Carotid Bruits] : no carotid bruits [No Varicosities] : no varicosities [Pedal Pulses Present] : the pedal pulses are present [No Edema] : there was no peripheral edema [No Extremity Clubbing/Cyanosis] : no extremity clubbing/cyanosis [Soft] : abdomen soft [Non Tender] : non-tender [Non-distended] : non-distended [Normal Bowel Sounds] : normal bowel sounds [Normal Anterior Cervical Nodes] : no anterior cervical lymphadenopathy [No CVA Tenderness] : no CVA  tenderness [No Spinal Tenderness] : no spinal tenderness [No Joint Swelling] : no joint swelling [Grossly Normal Strength/Tone] : grossly normal strength/tone [No Rash] : no rash [Coordination Grossly Intact] : coordination grossly intact [No Focal Deficits] : no focal deficits [Normal Gait] : normal gait [Normal Affect] : the affect was normal [Alert and Oriented x3] : oriented to person, place, and time [de-identified] : irregular

## 2023-05-18 NOTE — HEALTH RISK ASSESSMENT
[0] : 2) Feeling down, depressed, or hopeless: Not at all (0) [PHQ-2 Negative - No further assessment needed] : PHQ-2 Negative - No further assessment needed [Never] : Never [REM4Soxsa] : 0

## 2023-05-18 NOTE — HISTORY OF PRESENT ILLNESS
[FreeTextEntry8] : CC: apple watch altered him he has afib\par \par SELENE JUSTICE 58 y/o M with PMHx of T2DM, HLD, CONCEPCIÓN presents to the office today in the office after his apple watch indicated that he was in afib this morning. Pt states this is the first time he has had this occur. Pt stated he had 2 alerts last night 3:14 am and 5:14 am when he was sleeping and one this morning at 7:41 am. Pt denies having any palpitations. Pt states he recently just had a root canal with Dr. Call and is on medrol dose pack for pain. Say he plans to go back for an additional procedure for extraction as getting facial spasms and pain post procedure.  \par \par Denies chest pain, sob, avery, dizziness, diaphoresis, palpitations, LE swelling, orthopnea, syncope, n/v, headache.\par

## 2023-05-19 ENCOUNTER — APPOINTMENT (OUTPATIENT)
Dept: ENDOCRINOLOGY | Facility: CLINIC | Age: 58
End: 2023-05-19
Payer: COMMERCIAL

## 2023-05-19 VITALS
OXYGEN SATURATION: 99 % | DIASTOLIC BLOOD PRESSURE: 75 MMHG | SYSTOLIC BLOOD PRESSURE: 115 MMHG | TEMPERATURE: 98.2 F | BODY MASS INDEX: 33.91 KG/M2 | HEIGHT: 71 IN | HEART RATE: 50 BPM | WEIGHT: 242.25 LBS

## 2023-05-19 DIAGNOSIS — E66.01 MORBID (SEVERE) OBESITY DUE TO EXCESS CALORIES: ICD-10-CM

## 2023-05-19 DIAGNOSIS — E55.9 VITAMIN D DEFICIENCY, UNSPECIFIED: ICD-10-CM

## 2023-05-19 DIAGNOSIS — Z98.84 BARIATRIC SURGERY STATUS: ICD-10-CM

## 2023-05-19 LAB
GLUCOSE BLDC GLUCOMTR-MCNC: 5.8
HBA1C MFR BLD HPLC: 5.8

## 2023-05-19 PROCEDURE — 36415 COLL VENOUS BLD VENIPUNCTURE: CPT

## 2023-05-19 PROCEDURE — 83036 HEMOGLOBIN GLYCOSYLATED A1C: CPT | Mod: QW

## 2023-05-19 PROCEDURE — 99214 OFFICE O/P EST MOD 30 MIN: CPT | Mod: 25

## 2023-05-19 PROCEDURE — 82962 GLUCOSE BLOOD TEST: CPT

## 2023-05-19 NOTE — ADDENDUM
[FreeTextEntry1] : POCT HbA1c and glucose carried out in office today given diabetes diagnosis. \par Blood will be drawn in office today. \par

## 2023-05-19 NOTE — HISTORY OF PRESENT ILLNESS
[FreeTextEntry1] : Mr. JUSTICE  is a 57 year old  male who  returns today in follow up with regard to a history of type 2 diabetes mellitus He is s/p  gastric sleeve surgery per Dr. Vazquez on 2/28/23. Weight before surgery: 303 lbs, current weight: 242 lbs. \par \par The diabetes mellitus was dx about three and half years ago. There is no known history of retinopathy, nephropathy. He  too denies any history of neuropathy.\par \par He is currently on no diabetes medications. Was on Ozempic in the past. \par \par HGM of late has shown values to be ranging from 115-128 in the AM.  There has been no significant hypoglycemia.  He has been physically acitve with walking \par \par POCT A1c returned today at 5.8% ; previously 8.3% on 2/24/23\par \par He denies  any chest pain, sob, neurologic or ophthalmologic complaints. He  too denies any new podiatric concerns. He  is up to date with his ophthalmologic visit.  \par \par Coronary stent placed 3/4/2020. No c/p or sob.\par \par Additional medical history includes that of  hyperlipidemia.Does take otc vit d. Is on Humira per Dr. Buck for underlying psoriatic arthritis and psoriasis.\par Re the cad and  coronary stent, he is   on Plavix and and and metoprolol.  Too is on Atorvastatin 40 mg daily along with ASA 81 mg daily.\par \par Opho neg \par On otc vit d 3\par Sedentary -not exercising much.\par Optho Dr. Marte-no retinopathy

## 2023-05-22 ENCOUNTER — APPOINTMENT (OUTPATIENT)
Dept: ELECTROPHYSIOLOGY | Facility: CLINIC | Age: 58
End: 2023-05-22
Payer: COMMERCIAL

## 2023-05-22 ENCOUNTER — NON-APPOINTMENT (OUTPATIENT)
Age: 58
End: 2023-05-22

## 2023-05-22 ENCOUNTER — APPOINTMENT (OUTPATIENT)
Dept: CARDIOLOGY | Facility: CLINIC | Age: 58
End: 2023-05-22
Payer: COMMERCIAL

## 2023-05-22 VITALS
WEIGHT: 238 LBS | TEMPERATURE: 97.8 F | RESPIRATION RATE: 16 BRPM | BODY MASS INDEX: 33.19 KG/M2 | DIASTOLIC BLOOD PRESSURE: 76 MMHG | SYSTOLIC BLOOD PRESSURE: 114 MMHG | OXYGEN SATURATION: 99 % | HEART RATE: 46 BPM

## 2023-05-22 VITALS
WEIGHT: 244 LBS | HEIGHT: 71 IN | OXYGEN SATURATION: 98 % | SYSTOLIC BLOOD PRESSURE: 118 MMHG | BODY MASS INDEX: 34.16 KG/M2 | HEART RATE: 50 BPM | TEMPERATURE: 98.3 F | DIASTOLIC BLOOD PRESSURE: 68 MMHG

## 2023-05-22 DIAGNOSIS — I47.1 SUPRAVENTRICULAR TACHYCARDIA: ICD-10-CM

## 2023-05-22 DIAGNOSIS — I25.10 ATHEROSCLEROTIC HEART DISEASE OF NATIVE CORONARY ARTERY W/OUT ANGINA PECTORIS: ICD-10-CM

## 2023-05-22 DIAGNOSIS — R03.0 ELEVATED BLOOD-PRESSURE READING, W/OUT DIAGNOSIS OF HYPERTENSION: ICD-10-CM

## 2023-05-22 DIAGNOSIS — E78.5 HYPERLIPIDEMIA, UNSPECIFIED: ICD-10-CM

## 2023-05-22 DIAGNOSIS — E11.9 TYPE 2 DIABETES MELLITUS W/OUT COMPLICATIONS: ICD-10-CM

## 2023-05-22 DIAGNOSIS — R79.89 OTHER SPECIFIED ABNORMAL FINDINGS OF BLOOD CHEMISTRY: ICD-10-CM

## 2023-05-22 DIAGNOSIS — I48.0 PAROXYSMAL ATRIAL FIBRILLATION: ICD-10-CM

## 2023-05-22 LAB
CHOLEST SERPL-MCNC: 216 MG/DL
HDLC SERPL-MCNC: 33 MG/DL
LDLC SERPL DIRECT ASSAY-MCNC: 158 MG/DL
TRIGL SERPL-MCNC: 142 MG/DL

## 2023-05-22 PROCEDURE — 99213 OFFICE O/P EST LOW 20 MIN: CPT

## 2023-05-22 PROCEDURE — 93000 ELECTROCARDIOGRAM COMPLETE: CPT

## 2023-05-22 PROCEDURE — 99204 OFFICE O/P NEW MOD 45 MIN: CPT | Mod: 25

## 2023-05-22 RX ORDER — DAPAGLIFLOZIN 10 MG/1
10 TABLET, FILM COATED ORAL
Qty: 90 | Refills: 1 | Status: DISCONTINUED | COMMUNITY
Start: 2022-09-09 | End: 2023-05-22

## 2023-05-22 RX ORDER — METHYLPREDNISOLONE 4 MG/1
4 TABLET ORAL
Qty: 21 | Refills: 0 | Status: DISCONTINUED | COMMUNITY
Start: 2023-05-15

## 2023-05-22 RX ORDER — METOPROLOL TARTRATE 25 MG/1
25 TABLET, FILM COATED ORAL
Qty: 60 | Refills: 0 | Status: DISCONTINUED | COMMUNITY
Start: 2023-03-01

## 2023-05-22 RX ORDER — ZOSTER VACCINE RECOMBINANT, ADJUVANTED 50 MCG/0.5
50 KIT INTRAMUSCULAR
Qty: 2 | Refills: 0 | Status: DISCONTINUED | COMMUNITY
Start: 2022-09-01 | End: 2023-05-22

## 2023-05-22 RX ORDER — ENOXAPARIN SODIUM 40 MG/.4ML
40 INJECTION, SOLUTION SUBCUTANEOUS
Qty: 6 | Refills: 0 | Status: DISCONTINUED | COMMUNITY
Start: 2023-03-01

## 2023-05-22 RX ORDER — CEFPODOXIME PROXETIL 200 MG/1
200 TABLET, FILM COATED ORAL
Qty: 14 | Refills: 0 | Status: DISCONTINUED | COMMUNITY
Start: 2023-04-28

## 2023-05-22 RX ORDER — HALOBETASOL PROPIONATE 0.5 MG/G
0.05 OINTMENT TOPICAL
Qty: 45 | Refills: 0 | Status: DISCONTINUED | COMMUNITY
Start: 2023-04-14

## 2023-05-22 RX ORDER — OMEPRAZOLE 40 MG/1
40 CAPSULE, DELAYED RELEASE ORAL
Qty: 30 | Refills: 0 | Status: DISCONTINUED | COMMUNITY
Start: 2023-03-01

## 2023-05-22 RX ORDER — ADALIMUMAB 40MG/0.4ML
KIT SUBCUTANEOUS
Qty: 2 | Refills: 0 | Status: DISCONTINUED | COMMUNITY
Start: 2023-04-29

## 2023-05-22 RX ORDER — METFORMIN HYDROCHLORIDE 500 MG/1
500 TABLET, COATED ORAL TWICE DAILY
Qty: 360 | Refills: 1 | Status: DISCONTINUED | COMMUNITY
Start: 2021-01-29 | End: 2023-05-22

## 2023-05-22 NOTE — HISTORY OF PRESENT ILLNESS
[FreeTextEntry1] : This is a 56y/o man with a PMhx of HTN, HLD, CAD s/p PCI, DMII s/p gastric sleeve and now off medication, SVT s/p ablation 10 years ago at [facility], new;y dx afib (on Eliquis) seen by EP DR. Golden today who presents to the office for f/u . Pt reports overall tolerating BB well reports mild dizziness

## 2023-05-23 NOTE — HISTORY OF PRESENT ILLNESS
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 Last week he underwent a root canal and received steroids. His watch alerted him of an irregular heart rate a day or so later and was seen at Dr. Richards's office where his EKG revealed afib. Now on Eliquis. Does not recall feeling symptoms when he was in afib. Feels well at present. Has not had any alerts for afib since that day. Denies chest pain, palpitations, SOB, syncope or near syncope.\par

## 2023-05-23 NOTE — DISCUSSION/SUMMARY
[EKG obtained to assist in diagnosis and management of assessed problem(s)] : EKG obtained to assist in diagnosis and management of assessed problem(s) [FreeTextEntry1] : Impression:\par \par 1. Paroxysmal afib: EKG performed today to assess for presence of recurrent afib and reveals sinus bradycardia. Review of prior EKG from last week revealed afib. Now on Eliquis. [id number] score 0-1 (borderline HTN). On metoprolol 25 BID, bradycardia at rest but asymptomatic and states his HR goes up with activity. Able to exert himself without symptoms. Wears his Apple Iwatch daily with no further afib recorded. Recommend he continue metoprolol and Eliquis. If remains afib free, consider discontinuing Eliquis at that time. If he continues to have recurrent afib, consider improved afib management strategies such as ablation at that time (likely would not tolerate antiarrhythmics given sinus bradycardia at baseline). \par \par 2. HLD: resume statin therapy as prescribed and regular f/u with Cardiologist for routine lipid monitoring and management.\par \par Continue f/u with Cardiologist and RTO for f/u in 3 months.

## 2023-05-26 ENCOUNTER — APPOINTMENT (OUTPATIENT)
Dept: CARDIOLOGY | Facility: CLINIC | Age: 58
End: 2023-05-26
Payer: COMMERCIAL

## 2023-05-26 PROCEDURE — 93306 TTE W/DOPPLER COMPLETE: CPT

## 2023-05-26 NOTE — PATIENT PROFILE ADULT - VISION (WITH CORRECTIVE LENSES IF THE PATIENT USUALLY WEARS THEM):
If you are a smoker, it is important for your health to stop smoking. Please be aware that second hand smoke is also harmful. Normal vision: sees adequately in most situations; can see medication labels, newsprint

## 2023-06-07 NOTE — HISTORY OF PRESENT ILLNESS
[de-identified] : Wayne is a 57 year old male who presents for 3.5month post op visit s/p robotic assisted laparoscopic sleeve gastrectomy and hiatal hernia repair on 2/28/23. \par

## 2023-06-11 PROCEDURE — 93224 XTRNL ECG REC UP TO 48 HRS: CPT

## 2023-06-12 ENCOUNTER — APPOINTMENT (OUTPATIENT)
Dept: SURGERY | Facility: CLINIC | Age: 58
End: 2023-06-12

## 2023-06-12 ENCOUNTER — NON-APPOINTMENT (OUTPATIENT)
Age: 58
End: 2023-06-12

## 2023-06-28 ENCOUNTER — APPOINTMENT (OUTPATIENT)
Dept: CARDIOLOGY | Facility: CLINIC | Age: 58
End: 2023-06-28

## 2024-01-19 ENCOUNTER — NON-APPOINTMENT (OUTPATIENT)
Age: 59
End: 2024-01-19

## 2024-02-02 ENCOUNTER — APPOINTMENT (OUTPATIENT)
Dept: UROLOGY | Facility: CLINIC | Age: 59
End: 2024-02-02
Payer: COMMERCIAL

## 2024-02-02 VITALS
OXYGEN SATURATION: 98 % | TEMPERATURE: 98.3 F | DIASTOLIC BLOOD PRESSURE: 78 MMHG | BODY MASS INDEX: 34.16 KG/M2 | RESPIRATION RATE: 16 BRPM | HEART RATE: 48 BPM | SYSTOLIC BLOOD PRESSURE: 137 MMHG | HEIGHT: 71 IN | WEIGHT: 244 LBS

## 2024-02-02 DIAGNOSIS — Z87.440 PERSONAL HISTORY OF URINARY (TRACT) INFECTIONS: ICD-10-CM

## 2024-02-02 DIAGNOSIS — R39.12 POOR URINARY STREAM: ICD-10-CM

## 2024-02-02 DIAGNOSIS — Z12.5 ENCOUNTER FOR SCREENING FOR MALIGNANT NEOPLASM OF PROSTATE: ICD-10-CM

## 2024-02-02 PROCEDURE — 99204 OFFICE O/P NEW MOD 45 MIN: CPT | Mod: 25

## 2024-02-02 PROCEDURE — 51798 US URINE CAPACITY MEASURE: CPT

## 2024-02-02 NOTE — REASON FOR VISIT
[TextEntry] : 58-year-old male who presents for UTI symptoms and weak stream  About 1 month ago patient developed a change in his stream and urinary spraying.  He also had some urgency and burning.  He went to an urgent care where he was found to have a UTI with urine culture growing Citrobacter and was put on antibiotic for 2 weeks.  His symptoms improved dramatically.  He is no longer having any urgency.  At baseline he denies any issues with emptying or straining to empty.  He still sits to pee though because he feels like his stream is weaker with spraying.  He denies any nocturia.  He is mostly not bothered by his symptoms.  He has never seen a urologist before.  his last PSA was in 11/2022 and was 0.45 he denies a family history of prostate cancer  He had an abdominal ultrasound in 12/2022 which was unremarkable for the kidneys.  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  He has a history of type 2 diabetes and takes metformin for this.  His most recent HbA1c was 5.8% he also has a history of a gastric sleeve procedure in 2/2023.  Immediately after this procedure he developed a UTI.  Otherwise he has only had 2 UTIs in his life.  His history is also remarkable for psoriasis and psoriatic arthritis for which she takes Humira.  He has been on Humira for over 10 years.  He also has a history of CAD status post a stent in 2020.  He is on Plavix and metoprolol for this.  He has been dealing with constipation since 6/2023 after the gastric sleeve.  He developed unfortunately an anal fissure and has significant burning with wiping.  He reports that his bowels are now soft but he is unable to wipe due to pain.    PVR 13 cc

## 2024-02-02 NOTE — ASSESSMENT
[FreeTextEntry1] : 58-year-old male who presents for:  1.  Recent UTI-patient had a recent UTI but his PVR today was reassuring.  Discussed that we can send him for a renal bladder ultrasound to rule out nidus (stones etc) for UTI in a male.  For now he wants to hold off.  If he has recurrent infection, we will proceed with this.  Encouraged him to ensure adequate water intake to minimize his risk of infections.  2.  Weak stream-patient reports a weak stream that requires sitting otherwise he has sprain.  His PVR is only 13 cc but we discussed a trial with Flomax.  He wants to hold off on this at this time.  3.  Prostate cancer screening-his last PSA was in 2022 and was very reassuring.  We will obtain this today and I will call him with the results   Follow-up PSA

## 2024-02-02 NOTE — PHYSICAL EXAM
[Normal Appearance] : normal appearance [Well Groomed] : well groomed [General Appearance - In No Acute Distress] : no acute distress [] : no respiratory distress [Abdomen Soft] : soft [Abdomen Tenderness] : non-tender [Urethral Meatus] : meatus normal [Penis Abnormality] : normal circumcised penis [Testes Tenderness] : no tenderness of the testes [Testes Mass (___cm)] : there were no testicular masses [No Focal Deficits] : no focal deficits [Oriented To Time, Place, And Person] : oriented to person, place, and time [Affect] : the affect was normal [Mood] : the mood was normal

## 2024-02-02 NOTE — REVIEW OF SYSTEMS
[Urine Infection (bladder/kidney)] : bladder/kidney infection [Told you have blood in urine on a urine test] : told blood was present in a urine test [Negative] : Heme/Lymph

## 2024-02-03 NOTE — HISTORY OF PRESENT ILLNESS
[de-identified] : The patient presents for weight check in preparation for planned sleeve gastrectomy.\par No changes in medical history reported.  Continuing to moderate portion sizes and make more healthful choices.  Increasing activity levels as much as possible. \par Had bloodwork done.\par Waiting to hear back from psychologist.\par Working on scheduling pre-op appointments.\par 
03-Feb-2024 13:57
03-Feb-2024 14:45

## 2024-02-05 LAB — PSA SERPL-MCNC: 2.85 NG/ML

## 2024-02-20 ENCOUNTER — TRANSCRIPTION ENCOUNTER (OUTPATIENT)
Age: 59
End: 2024-02-20

## 2024-02-20 RX ORDER — METOPROLOL SUCCINATE 25 MG/1
25 TABLET, EXTENDED RELEASE ORAL
Qty: 180 | Refills: 3 | Status: ACTIVE | COMMUNITY
Start: 2020-03-04 | End: 1900-01-01

## 2024-02-20 RX ORDER — ATORVASTATIN CALCIUM 40 MG/1
40 TABLET, FILM COATED ORAL
Qty: 90 | Refills: 3 | Status: ACTIVE | COMMUNITY
Start: 2019-10-14 | End: 1900-01-01

## 2024-05-07 LAB
ALBUMIN SERPL ELPH-MCNC: 4.5 G/DL
ALP BLD-CCNC: 70 U/L
ALT SERPL-CCNC: 34 U/L
AST SERPL-CCNC: 48 U/L
BASOPHILS # BLD AUTO: 0.09 K/UL
BASOPHILS NFR BLD AUTO: 1.3 %
BILIRUB DIRECT SERPL-MCNC: 0.2 MG/DL
BILIRUB INDIRECT SERPL-MCNC: 0.3 MG/DL
BILIRUB SERPL-MCNC: 0.5 MG/DL
EOSINOPHIL # BLD AUTO: 0.21 K/UL
EOSINOPHIL NFR BLD AUTO: 3.1 %
GGT SERPL-CCNC: 38 U/L
HCT VFR BLD CALC: 42.5 %
HGB BLD-MCNC: 14.1 G/DL
IMM GRANULOCYTES NFR BLD AUTO: 0.3 %
LYMPHOCYTES # BLD AUTO: 2.36 K/UL
LYMPHOCYTES NFR BLD AUTO: 34.4 %
MAN DIFF?: NORMAL
MCHC RBC-ENTMCNC: 29.7 PG
MCHC RBC-ENTMCNC: 33.2 GM/DL
MCV RBC AUTO: 89.5 FL
MONOCYTES # BLD AUTO: 0.69 K/UL
MONOCYTES NFR BLD AUTO: 10 %
NEUTROPHILS # BLD AUTO: 3.5 K/UL
NEUTROPHILS NFR BLD AUTO: 50.9 %
PLATELET # BLD AUTO: 246 K/UL
PROT SERPL-MCNC: 7.3 G/DL
RBC # BLD: 4.75 M/UL
RBC # FLD: 13.9 %
WBC # FLD AUTO: 6.87 K/UL

## 2024-08-30 ENCOUNTER — TRANSCRIPTION ENCOUNTER (OUTPATIENT)
Age: 59
End: 2024-08-30

## 2024-09-12 ENCOUNTER — NON-APPOINTMENT (OUTPATIENT)
Age: 59
End: 2024-09-12

## 2024-10-03 ENCOUNTER — TRANSCRIPTION ENCOUNTER (OUTPATIENT)
Age: 59
End: 2024-10-03

## 2024-10-04 ENCOUNTER — TRANSCRIPTION ENCOUNTER (OUTPATIENT)
Age: 59
End: 2024-10-04

## 2024-12-01 NOTE — ED PROVIDER NOTE - PROVIDER TOKENS
Pt slept through the night with no signs of pain or distress observed  Cont of B+B throughout shift  Pt is currently sleeping  Call bell within reach  Will continue to monitor and follow plan of care  32.6 PROVIDER:[TOKEN:[[id number]]],PROVIDER:[TOKEN:[[id number]]]

## 2025-02-19 ENCOUNTER — APPOINTMENT (OUTPATIENT)
Dept: CARDIOLOGY | Facility: CLINIC | Age: 60
End: 2025-02-19
Payer: COMMERCIAL

## 2025-02-19 VITALS
TEMPERATURE: 98.1 F | OXYGEN SATURATION: 99 % | HEART RATE: 50 BPM | DIASTOLIC BLOOD PRESSURE: 66 MMHG | HEIGHT: 71 IN | SYSTOLIC BLOOD PRESSURE: 114 MMHG | RESPIRATION RATE: 16 BRPM | WEIGHT: 244 LBS | BODY MASS INDEX: 34.16 KG/M2

## 2025-02-19 DIAGNOSIS — I25.10 ATHEROSCLEROTIC HEART DISEASE OF NATIVE CORONARY ARTERY W/OUT ANGINA PECTORIS: ICD-10-CM

## 2025-02-19 DIAGNOSIS — R42 DIZZINESS AND GIDDINESS: ICD-10-CM

## 2025-02-19 DIAGNOSIS — K76.0 FATTY (CHANGE OF) LIVER, NOT ELSEWHERE CLASSIFIED: ICD-10-CM

## 2025-02-19 DIAGNOSIS — J98.4 OTHER DISORDERS OF LUNG: ICD-10-CM

## 2025-02-19 DIAGNOSIS — I48.0 PAROXYSMAL ATRIAL FIBRILLATION: ICD-10-CM

## 2025-02-19 DIAGNOSIS — E11.9 TYPE 2 DIABETES MELLITUS W/OUT COMPLICATIONS: ICD-10-CM

## 2025-02-19 DIAGNOSIS — R03.0 ELEVATED BLOOD-PRESSURE READING, W/OUT DIAGNOSIS OF HYPERTENSION: ICD-10-CM

## 2025-02-19 DIAGNOSIS — R39.13 SPLITTING OF URINARY STREAM: ICD-10-CM

## 2025-02-19 DIAGNOSIS — Z12.11 ENCOUNTER FOR SCREENING FOR MALIGNANT NEOPLASM OF COLON: ICD-10-CM

## 2025-02-19 DIAGNOSIS — K60.2 ANAL FISSURE, UNSPECIFIED: ICD-10-CM

## 2025-02-19 DIAGNOSIS — Z00.00 ENCOUNTER FOR GENERAL ADULT MEDICAL EXAMINATION W/OUT ABNORMAL FINDINGS: ICD-10-CM

## 2025-02-19 DIAGNOSIS — Z12.5 ENCOUNTER FOR SCREENING FOR MALIGNANT NEOPLASM OF PROSTATE: ICD-10-CM

## 2025-02-19 DIAGNOSIS — Z13.228 ENCOUNTER FOR SCREENING FOR OTHER METABOLIC DISORDERS: ICD-10-CM

## 2025-02-19 DIAGNOSIS — L40.9 PSORIASIS, UNSPECIFIED: ICD-10-CM

## 2025-02-19 DIAGNOSIS — E66.9 OBESITY, UNSPECIFIED: ICD-10-CM

## 2025-02-19 DIAGNOSIS — E78.5 HYPERLIPIDEMIA, UNSPECIFIED: ICD-10-CM

## 2025-02-19 DIAGNOSIS — G47.33 OBSTRUCTIVE SLEEP APNEA (ADULT) (PEDIATRIC): ICD-10-CM

## 2025-02-19 PROCEDURE — 93880 EXTRACRANIAL BILAT STUDY: CPT

## 2025-02-19 PROCEDURE — 93306 TTE W/DOPPLER COMPLETE: CPT

## 2025-02-19 PROCEDURE — 93000 ELECTROCARDIOGRAM COMPLETE: CPT

## 2025-02-19 PROCEDURE — 99396 PREV VISIT EST AGE 40-64: CPT

## 2025-02-20 PROBLEM — E66.9 OBESITY: Status: ACTIVE | Noted: 2025-02-19

## 2025-02-20 PROBLEM — K76.0 HEPATIC STEATOSIS: Status: ACTIVE | Noted: 2025-02-19

## 2025-02-20 LAB
25(OH)D3 SERPL-MCNC: 31.9 NG/ML
ALBUMIN SERPL ELPH-MCNC: 4.4 G/DL
ALP BLD-CCNC: 54 U/L
ALT SERPL-CCNC: 20 U/L
APPEARANCE: CLEAR
AST SERPL-CCNC: 23 U/L
BACTERIA: NEGATIVE /HPF
BASOPHILS # BLD AUTO: 0.09 K/UL
BASOPHILS NFR BLD AUTO: 1.2 %
BILIRUB DIRECT SERPL-MCNC: 0.2 MG/DL
BILIRUB INDIRECT SERPL-MCNC: 0.5 MG/DL
BILIRUB SERPL-MCNC: 0.7 MG/DL
BILIRUBIN URINE: NEGATIVE
BLOOD URINE: NEGATIVE
CAST: 0 /LPF
CHOLEST SERPL-MCNC: 104 MG/DL
CK SERPL-CCNC: 299 U/L
COLOR: YELLOW
CREAT SPEC-SCNC: 136 MG/DL
EOSINOPHIL # BLD AUTO: 0.24 K/UL
EOSINOPHIL NFR BLD AUTO: 3.1 %
EPITHELIAL CELLS: 0 /HPF
ESTIMATED AVERAGE GLUCOSE: 128 MG/DL
FOLATE SERPL-MCNC: 5.8 NG/ML
GLUCOSE QUALITATIVE U: NEGATIVE MG/DL
HBA1C MFR BLD HPLC: 6.1 %
HCT VFR BLD CALC: 43.6 %
HDLC SERPL-MCNC: 41 MG/DL
HGB BLD-MCNC: 14.9 G/DL
IMM GRANULOCYTES NFR BLD AUTO: 0.1 %
KETONES URINE: NEGATIVE MG/DL
LDLC SERPL CALC-MCNC: 50 MG/DL
LEUKOCYTE ESTERASE URINE: NEGATIVE
LYMPHOCYTES # BLD AUTO: 3.08 K/UL
LYMPHOCYTES NFR BLD AUTO: 40.2 %
MAGNESIUM SERPL-MCNC: 2 MG/DL
MAN DIFF?: NORMAL
MCHC RBC-ENTMCNC: 30.4 PG
MCHC RBC-ENTMCNC: 34.2 G/DL
MCV RBC AUTO: 89 FL
MICROALBUMIN 24H UR DL<=1MG/L-MCNC: <1.2 MG/DL
MICROALBUMIN/CREAT 24H UR-RTO: NORMAL MG/G
MICROSCOPIC-UA: NORMAL
MONOCYTES # BLD AUTO: 0.55 K/UL
MONOCYTES NFR BLD AUTO: 7.2 %
NEUTROPHILS # BLD AUTO: 3.69 K/UL
NEUTROPHILS NFR BLD AUTO: 48.2 %
NITRITE URINE: NEGATIVE
NONHDLC SERPL-MCNC: 63 MG/DL
PH URINE: 6.5
PHOSPHATE SERPL-MCNC: 3.3 MG/DL
PLATELET # BLD AUTO: 191 K/UL
PROT SERPL-MCNC: 7.4 G/DL
PROTEIN URINE: NEGATIVE MG/DL
PSA SERPL-MCNC: 0.76 NG/ML
RBC # BLD: 4.9 M/UL
RBC # FLD: 13.2 %
RED BLOOD CELLS URINE: 1 /HPF
SPECIFIC GRAVITY URINE: 1.02
T4 FREE SERPL-MCNC: 1.2 NG/DL
TRIGL SERPL-MCNC: 59 MG/DL
TSH SERPL-ACNC: 1.98 UIU/ML
UROBILINOGEN URINE: 1 MG/DL
VIT B12 SERPL-MCNC: 455 PG/ML
WBC # FLD AUTO: 7.66 K/UL
WHITE BLOOD CELLS URINE: 0 /HPF

## 2025-02-26 ENCOUNTER — NON-APPOINTMENT (OUTPATIENT)
Age: 60
End: 2025-02-26

## 2025-02-26 LAB
ANION GAP SERPL CALC-SCNC: 15 MMOL/L
BUN SERPL-MCNC: 12 MG/DL
CALCIUM SERPL-MCNC: 9.7 MG/DL
CHLORIDE SERPL-SCNC: 100 MMOL/L
CO2 SERPL-SCNC: 26 MMOL/L
CREAT SERPL-MCNC: 0.87 MG/DL
EGFR: 100 ML/MIN/1.73M2
GLUCOSE SERPL-MCNC: 91 MG/DL
POTASSIUM SERPL-SCNC: 4 MMOL/L
SODIUM SERPL-SCNC: 141 MMOL/L

## 2025-02-28 ENCOUNTER — APPOINTMENT (OUTPATIENT)
Dept: CARDIOLOGY | Facility: CLINIC | Age: 60
End: 2025-02-28
Payer: COMMERCIAL

## 2025-02-28 PROCEDURE — 78452 HT MUSCLE IMAGE SPECT MULT: CPT

## 2025-02-28 PROCEDURE — A9500: CPT

## 2025-02-28 PROCEDURE — 93015 CV STRESS TEST SUPVJ I&R: CPT

## 2025-03-06 ENCOUNTER — TRANSCRIPTION ENCOUNTER (OUTPATIENT)
Age: 60
End: 2025-03-06

## 2025-03-11 ENCOUNTER — NON-APPOINTMENT (OUTPATIENT)
Age: 60
End: 2025-03-11

## 2025-03-11 ENCOUNTER — APPOINTMENT (OUTPATIENT)
Dept: INTERNAL MEDICINE | Facility: CLINIC | Age: 60
End: 2025-03-11
Payer: COMMERCIAL

## 2025-03-11 VITALS
WEIGHT: 244 LBS | HEIGHT: 71 IN | BODY MASS INDEX: 34.16 KG/M2 | SYSTOLIC BLOOD PRESSURE: 130 MMHG | OXYGEN SATURATION: 98 % | DIASTOLIC BLOOD PRESSURE: 80 MMHG | HEART RATE: 56 BPM

## 2025-03-11 VITALS — HEART RATE: 75 BPM

## 2025-03-11 VITALS — DIASTOLIC BLOOD PRESSURE: 80 MMHG | SYSTOLIC BLOOD PRESSURE: 129 MMHG

## 2025-03-11 DIAGNOSIS — I48.0 PAROXYSMAL ATRIAL FIBRILLATION: ICD-10-CM

## 2025-03-11 DIAGNOSIS — R42 DIZZINESS AND GIDDINESS: ICD-10-CM

## 2025-03-11 DIAGNOSIS — G47.33 OBSTRUCTIVE SLEEP APNEA (ADULT) (PEDIATRIC): ICD-10-CM

## 2025-03-11 DIAGNOSIS — R11.0 NAUSEA: ICD-10-CM

## 2025-03-11 DIAGNOSIS — H61.20 IMPACTED CERUMEN, UNSPECIFIED EAR: ICD-10-CM

## 2025-03-11 DIAGNOSIS — H93.13 TINNITUS, BILATERAL: ICD-10-CM

## 2025-03-11 DIAGNOSIS — R03.0 ELEVATED BLOOD-PRESSURE READING, W/OUT DIAGNOSIS OF HYPERTENSION: ICD-10-CM

## 2025-03-11 DIAGNOSIS — I25.10 ATHEROSCLEROTIC HEART DISEASE OF NATIVE CORONARY ARTERY W/OUT ANGINA PECTORIS: ICD-10-CM

## 2025-03-11 PROCEDURE — 93000 ELECTROCARDIOGRAM COMPLETE: CPT

## 2025-03-11 PROCEDURE — G2211 COMPLEX E/M VISIT ADD ON: CPT | Mod: NC

## 2025-03-11 PROCEDURE — 99204 OFFICE O/P NEW MOD 45 MIN: CPT

## 2025-03-12 PROBLEM — R11.0 NAUSEA: Status: ACTIVE | Noted: 2025-03-11

## 2025-03-12 PROBLEM — H93.13 TINNITUS OF BOTH EARS: Status: ACTIVE | Noted: 2025-03-11

## 2025-03-12 PROBLEM — H61.20 IMPACTED EAR WAX: Status: ACTIVE | Noted: 2025-03-11

## 2025-03-16 PROCEDURE — 93241 XTRNL ECG REC>48HR<7D: CPT

## 2025-03-17 ENCOUNTER — NON-APPOINTMENT (OUTPATIENT)
Age: 60
End: 2025-03-17

## 2025-03-30 PROCEDURE — 93241 XTRNL ECG REC>48HR<7D: CPT

## 2025-03-31 ENCOUNTER — NON-APPOINTMENT (OUTPATIENT)
Age: 60
End: 2025-03-31

## 2025-03-31 ENCOUNTER — APPOINTMENT (OUTPATIENT)
Dept: ELECTROPHYSIOLOGY | Facility: CLINIC | Age: 60
End: 2025-03-31
Payer: COMMERCIAL

## 2025-03-31 VITALS
OXYGEN SATURATION: 98 % | HEART RATE: 54 BPM | BODY MASS INDEX: 34.02 KG/M2 | WEIGHT: 243 LBS | DIASTOLIC BLOOD PRESSURE: 72 MMHG | SYSTOLIC BLOOD PRESSURE: 124 MMHG | TEMPERATURE: 97.1 F | HEIGHT: 71 IN

## 2025-03-31 DIAGNOSIS — E78.5 HYPERLIPIDEMIA, UNSPECIFIED: ICD-10-CM

## 2025-03-31 DIAGNOSIS — I47.10 SUPRAVENTRICULAR TACHYCARDIA, UNSPECIFIED: ICD-10-CM

## 2025-03-31 DIAGNOSIS — I48.0 PAROXYSMAL ATRIAL FIBRILLATION: ICD-10-CM

## 2025-03-31 PROCEDURE — 93000 ELECTROCARDIOGRAM COMPLETE: CPT

## 2025-03-31 PROCEDURE — G2211 COMPLEX E/M VISIT ADD ON: CPT | Mod: NC

## 2025-03-31 PROCEDURE — 99214 OFFICE O/P EST MOD 30 MIN: CPT

## 2025-05-08 ENCOUNTER — RX RENEWAL (OUTPATIENT)
Age: 60
End: 2025-05-08

## (undated) DEVICE — XI ARM FORCEP TENACULUM

## (undated) DEVICE — VISIGI 3D SLEEVE GASTRECTOMY 36FR

## (undated) DEVICE — SCOPE WARMER SEAL DISP

## (undated) DEVICE — TROCAR COVIDIEN VERSASTEP PLUS 12MM STANDARD

## (undated) DEVICE — TUBING SUCTION 20FT

## (undated) DEVICE — VENODYNE/SCD SLEEVE CALF LARGE

## (undated) DEVICE — DRAPE TOWEL BLUE 17" X 24"

## (undated) DEVICE — FOLEY TRAY 16FR 5CC LF LUBRISIL ADVANCE TEMP CLOSED

## (undated) DEVICE — SUT VLOC 180 0 9" GS-21 GREEN

## (undated) DEVICE — XI VESSEL SEALER

## (undated) DEVICE — XI ARM FORCEP PROGRASP 8MM

## (undated) DEVICE — XI DRAPE COLUMN

## (undated) DEVICE — DRAIN PENROSE .25" X 18" LATEX

## (undated) DEVICE — DRAPE MAYO STAND 30"

## (undated) DEVICE — XI ARM CLIP APPLIER MEDIUM-LARGE

## (undated) DEVICE — SUT POLYSORB 3-0 30" V-20 UNDYED

## (undated) DEVICE — DRSG TEGADERM 6"X8"

## (undated) DEVICE — LUBRICANT INST ELECTROLUBE Z SOLUTION

## (undated) DEVICE — XI ARM NEEDLE DRIVER LARGE

## (undated) DEVICE — TUBING IRRIGATION DAVOL SYSTEM X STREAM

## (undated) DEVICE — XI SEAL UNIV 5- 8 MM

## (undated) DEVICE — TROCAR COVIDIEN VERSAONE BLADED SMOOTH 12MM LONG

## (undated) DEVICE — PACK ADVANCED LAPAROSCOPIC NS

## (undated) DEVICE — DRAPE 3/4 SHEET W REINFORCEMENT 56X77"

## (undated) DEVICE — MARKING PEN W RULER

## (undated) DEVICE — WARMING BLANKET UPPER ADULT

## (undated) DEVICE — LAP PAD 18 X 18"

## (undated) DEVICE — SUT SOFSILK 2-0 18" C-23

## (undated) DEVICE — SUCTION YANKAUER NO CONTROL VENT

## (undated) DEVICE — MEDICATION LABELS W MARKER

## (undated) DEVICE — FOLEY TRAY 16FR 5CC LTX UMETER CLOSED

## (undated) DEVICE — Device

## (undated) DEVICE — NDL COUNTER FOAM AND MAGNET 40-70

## (undated) DEVICE — BLADE SCALPEL SAFETYLOCK #10

## (undated) DEVICE — SOL IRR POUR H2O 250ML

## (undated) DEVICE — DRSG OPSITE 13.75 X 4"

## (undated) DEVICE — XI ARM NEEDLE DRIVER MEGA

## (undated) DEVICE — INSUFFLATION NDL COVIDIEN STEP 14G FOR STEP/VERSASTEP

## (undated) DEVICE — DRAIN RESERVOIR FOR JACKSON PRATT 100CC CARDINAL

## (undated) DEVICE — VALVE YELLOW PORT SEAL PLUS 5MM

## (undated) DEVICE — SUT POLYSORB 0 30" GS-23

## (undated) DEVICE — XI ARM NEEDLE DRIVER SUTURECUT MEGA 8MM

## (undated) DEVICE — VISITEC 4X4

## (undated) DEVICE — SUT POLYSORB 2-0 30" V-20 UNDYED

## (undated) DEVICE — COVIDIEN SIGNIA POWER CONTROL SHELL

## (undated) DEVICE — LUBRICATING JELLY ONESHOT 1.25OZ

## (undated) DEVICE — XI ARM SCISSOR MONO CURVED

## (undated) DEVICE — ENDOCATCH 10MM SPECIMEN POUCH

## (undated) DEVICE — WARMING BLANKET LOWER ADULT

## (undated) DEVICE — TROCAR COVIDIEN VERSASTEP PLUS 15MM STANDARD

## (undated) DEVICE — TAPE SILK 3"

## (undated) DEVICE — GOWN TRIMAX LG

## (undated) DEVICE — XI ARM GRASPER TIP UP FENESTRATED

## (undated) DEVICE — DRAIN JACKSON PRATT 10MM FLAT FULL NO TROCAR

## (undated) DEVICE — DRAPE 1/2 SHEET 40X57"

## (undated) DEVICE — PACK LAP CHOLE LAP APPENDECTOMY

## (undated) DEVICE — IRRIGATION TRAY W PISTON SYRINGE 60ML

## (undated) DEVICE — SUT POLYSORB 0 36" GU-46

## (undated) DEVICE — GOWN XL EXTRA LONG

## (undated) DEVICE — NDL HYPO SAFE 22G X 1.5" (BLACK)

## (undated) DEVICE — SOL IRR POUR NS 0.9% 500ML

## (undated) DEVICE — SUT VICRYL 3-0 27" CT-2 UNDYED

## (undated) DEVICE — DRSG OPSITE 2.5 X 2"

## (undated) DEVICE — SPECIMEN CONTAINER 100ML

## (undated) DEVICE — DRSG MASTISOL

## (undated) DEVICE — XI ARM FORCEP CADIERE 8MM

## (undated) DEVICE — XI ARM FORCEP MARYLAND BIPOLAR

## (undated) DEVICE — BLADE SCALPEL SAFETYLOCK #15

## (undated) DEVICE — SUT SOFSILK 2-0 30" V-20

## (undated) DEVICE — SHEARS COVIDIEN ENDO SHEAR 5MM X 45CM LONG W MONOPOLAR CAUTERY

## (undated) DEVICE — D HELP - CLEARVIEW CLEARIFY SYSTEM

## (undated) DEVICE — TROCAR COVIDIEN VERSASTEP PLUS 11MM STANDARD

## (undated) DEVICE — PACK BASIN SPECIAL PROCEDURE

## (undated) DEVICE — GLV 7.5 PROTEXIS (WHITE)

## (undated) DEVICE — XI TIP COVER

## (undated) DEVICE — SUT BIOSYN 4-0 18" P-12

## (undated) DEVICE — XI OBTURATOR OPTICAL BLADELESS 8MM

## (undated) DEVICE — TUBING INSUFFLATION LAP FILTER 10FT

## (undated) DEVICE — XI DRAPE ARM

## (undated) DEVICE — ENDOCATCH II 15MM

## (undated) DEVICE — GLV 8 PROTEXIS (WHITE)

## (undated) DEVICE — POSITIONER FOAM EGG CRATE ULNAR 2PCS (PINK)

## (undated) DEVICE — PREP CHLORAPREP HI-LITE ORANGE 26ML

## (undated) DEVICE — XI ARM PERMANENT CAUTERY HOOK

## (undated) DEVICE — SUT TICRON 0 30" GS-22

## (undated) DEVICE — DRSG STERISTRIPS 0.5 X 4"

## (undated) DEVICE — STAPLER SKIN VISI-STAT 35 WIDE

## (undated) DEVICE — DRAPE INSTRUMENT POUCH 6.75" X 11"

## (undated) DEVICE — XI ARM PERMANENT CAUTERY SPATULA

## (undated) DEVICE — TUBING STRYKEFLOW II SUCTION / IRRIGATOR

## (undated) DEVICE — XI ARM FORCEP FENESTRATED BIPOLAR 8MM

## (undated) DEVICE — XI ARM DISSECTOR CURVED BIPOLAR 8MM

## (undated) DEVICE — XI ARM CLIP APPLIER LARGE

## (undated) DEVICE — DRAPE BACK TABLE COVER HEAVY DUTY 60"